# Patient Record
Sex: MALE | Race: WHITE | NOT HISPANIC OR LATINO | Employment: OTHER | URBAN - METROPOLITAN AREA
[De-identification: names, ages, dates, MRNs, and addresses within clinical notes are randomized per-mention and may not be internally consistent; named-entity substitution may affect disease eponyms.]

---

## 2024-06-18 ENCOUNTER — OFFICE VISIT (OUTPATIENT)
Dept: FAMILY MEDICINE CLINIC | Facility: CLINIC | Age: 64
End: 2024-06-18
Payer: MEDICARE

## 2024-06-18 VITALS
DIASTOLIC BLOOD PRESSURE: 82 MMHG | HEIGHT: 69 IN | HEART RATE: 70 BPM | OXYGEN SATURATION: 96 % | WEIGHT: 286.4 LBS | BODY MASS INDEX: 42.42 KG/M2 | SYSTOLIC BLOOD PRESSURE: 135 MMHG

## 2024-06-18 DIAGNOSIS — Z13.220 SCREENING FOR LIPID DISORDERS: ICD-10-CM

## 2024-06-18 DIAGNOSIS — H61.23 IMPACTED CERUMEN OF BOTH EARS: ICD-10-CM

## 2024-06-18 DIAGNOSIS — I10 ESSENTIAL HYPERTENSION: ICD-10-CM

## 2024-06-18 DIAGNOSIS — Z76.89 ENCOUNTER TO ESTABLISH CARE: ICD-10-CM

## 2024-06-18 DIAGNOSIS — N40.1 BENIGN PROSTATIC HYPERPLASIA WITH URINARY OBSTRUCTION: ICD-10-CM

## 2024-06-18 DIAGNOSIS — Z12.5 SCREENING PSA (PROSTATE SPECIFIC ANTIGEN): ICD-10-CM

## 2024-06-18 DIAGNOSIS — R05.8 ALLERGIC COUGH: ICD-10-CM

## 2024-06-18 DIAGNOSIS — Z13.29 SCREENING FOR THYROID DISORDER: ICD-10-CM

## 2024-06-18 DIAGNOSIS — N13.8 BENIGN PROSTATIC HYPERPLASIA WITH URINARY OBSTRUCTION: ICD-10-CM

## 2024-06-18 DIAGNOSIS — F41.9 ANXIETY: ICD-10-CM

## 2024-06-18 DIAGNOSIS — K21.9 GASTROESOPHAGEAL REFLUX DISEASE, UNSPECIFIED WHETHER ESOPHAGITIS PRESENT: ICD-10-CM

## 2024-06-18 DIAGNOSIS — Z23 NEED FOR PNEUMOCOCCAL 20-VALENT CONJUGATE VACCINATION: ICD-10-CM

## 2024-06-18 DIAGNOSIS — G89.29 CHRONIC LOW BACK PAIN, UNSPECIFIED BACK PAIN LATERALITY, UNSPECIFIED WHETHER SCIATICA PRESENT: ICD-10-CM

## 2024-06-18 DIAGNOSIS — Z11.59 NEED FOR HEPATITIS C SCREENING TEST: ICD-10-CM

## 2024-06-18 DIAGNOSIS — Z00.00 WELCOME TO MEDICARE PREVENTIVE VISIT: Primary | ICD-10-CM

## 2024-06-18 DIAGNOSIS — M54.50 CHRONIC LOW BACK PAIN, UNSPECIFIED BACK PAIN LATERALITY, UNSPECIFIED WHETHER SCIATICA PRESENT: ICD-10-CM

## 2024-06-18 DIAGNOSIS — F32.5 MAJOR DEPRESSIVE DISORDER IN FULL REMISSION, UNSPECIFIED WHETHER RECURRENT: ICD-10-CM

## 2024-06-18 DIAGNOSIS — N52.9 ERECTILE DYSFUNCTION, UNSPECIFIED ERECTILE DYSFUNCTION TYPE: ICD-10-CM

## 2024-06-18 PROBLEM — R55 NEAR SYNCOPE: Status: ACTIVE | Noted: 2023-12-14

## 2024-06-18 PROBLEM — F13.20 SEDATIVE DEPENDENCE: Status: ACTIVE | Noted: 2023-12-14

## 2024-06-18 PROBLEM — F33.9 RECURRENT MAJOR DEPRESSION: Status: ACTIVE | Noted: 2024-03-14

## 2024-06-18 PROBLEM — E66.01 MORBID OBESITY: Status: ACTIVE | Noted: 2023-12-14

## 2024-06-18 PROBLEM — R74.8 ELEVATED LIVER ENZYMES: Status: ACTIVE | Noted: 2024-03-21

## 2024-06-18 PROCEDURE — G0402 INITIAL PREVENTIVE EXAM: HCPCS

## 2024-06-18 PROCEDURE — 90677 PCV20 VACCINE IM: CPT

## 2024-06-18 PROCEDURE — 3079F DIAST BP 80-89 MM HG: CPT

## 2024-06-18 PROCEDURE — 69209 REMOVE IMPACTED EAR WAX UNI: CPT

## 2024-06-18 PROCEDURE — 1170F FXNL STATUS ASSESSED: CPT

## 2024-06-18 PROCEDURE — G0009 ADMIN PNEUMOCOCCAL VACCINE: HCPCS

## 2024-06-18 PROCEDURE — 3075F SYST BP GE 130 - 139MM HG: CPT

## 2024-06-18 PROCEDURE — 99214 OFFICE O/P EST MOD 30 MIN: CPT

## 2024-06-18 PROCEDURE — 1125F AMNT PAIN NOTED PAIN PRSNT: CPT

## 2024-06-18 RX ORDER — TELMISARTAN/HYDROCHLOROTHIAZID 40-12.5 MG
1 TABLET ORAL DAILY
COMMUNITY
Start: 2024-04-03

## 2024-06-18 RX ORDER — ALPRAZOLAM 0.5 MG/1
1 TABLET ORAL DAILY
COMMUNITY

## 2024-06-18 RX ORDER — BUPROPION HYDROCHLORIDE 150 MG/1
150 TABLET ORAL DAILY
Qty: 30 TABLET | Refills: 0 | Status: SHIPPED | OUTPATIENT
Start: 2024-06-18

## 2024-06-18 RX ORDER — TAMSULOSIN HYDROCHLORIDE 0.4 MG/1
1 CAPSULE ORAL DAILY
COMMUNITY
End: 2024-06-18 | Stop reason: SDUPTHER

## 2024-06-18 RX ORDER — TAMSULOSIN HYDROCHLORIDE 0.4 MG/1
1 CAPSULE ORAL DAILY
Qty: 30 CAPSULE | Refills: 2 | Status: SHIPPED | OUTPATIENT
Start: 2024-06-18

## 2024-06-18 RX ORDER — FEXOFENADINE HCL 180 MG/1
180 TABLET ORAL DAILY
Qty: 90 TABLET | Refills: 1 | Status: SHIPPED | OUTPATIENT
Start: 2024-06-18

## 2024-06-18 RX ORDER — BUPROPION HYDROCHLORIDE 300 MG/1
1 TABLET ORAL DAILY
COMMUNITY
Start: 2024-04-03 | End: 2024-06-18 | Stop reason: SDUPTHER

## 2024-06-18 RX ORDER — TADALAFIL 5 MG/1
1 TABLET ORAL DAILY
COMMUNITY

## 2024-06-18 RX ORDER — TRAMADOL HYDROCHLORIDE 50 MG/1
50 TABLET ORAL EVERY 8 HOURS PRN
COMMUNITY

## 2024-06-18 RX ORDER — PANTOPRAZOLE SODIUM 40 MG/1
1 TABLET, DELAYED RELEASE ORAL DAILY
COMMUNITY
Start: 2024-04-03

## 2024-06-18 RX ORDER — ATENOLOL 25 MG/1
1 TABLET ORAL DAILY
COMMUNITY
Start: 2024-04-03

## 2024-06-18 NOTE — PROGRESS NOTES
The ABCs of the Annual Wellness Visit  Welcome to Medicare Visit    Subjective     Dennis Finnegan is a 64 y.o. male who presents for a  Welcome to Medicare Visit.    The following portions of the patient's history were reviewed and   updated as appropriate: He  has no past medical history on file.  He does not have any pertinent problems on file.  He  has no past surgical history on file.  His family history is not on file.  He  has no history on file for tobacco use, alcohol use, and drug use.  Current Outpatient Medications   Medication Sig Dispense Refill   • ALPRAZolam (XANAX) 0.5 MG tablet Take 1 tablet by mouth Daily.     • atenolol (TENORMIN) 25 MG tablet Take 1 tablet by mouth Daily.     • buPROPion XL (WELLBUTRIN XL) 150 MG 24 hr tablet Take 1 tablet by mouth Daily. 30 tablet 0   • Micardis HCT 40-12.5 MG per tablet Take 1 tablet by mouth Daily.     • pantoprazole (PROTONIX) 40 MG EC tablet Take 1 tablet by mouth Daily.     • tadalafil (CIALIS) 5 MG tablet Take 1 tablet by mouth Daily.     • tamsulosin (FLOMAX) 0.4 MG capsule 24 hr capsule Take 1 capsule by mouth Daily. 30 capsule 2   • traMADol (ULTRAM) 50 MG tablet Take 1 tablet by mouth Every 8 (Eight) Hours As Needed for Moderate Pain.     • fexofenadine (Allegra Allergy) 180 MG tablet Take 1 tablet by mouth Daily. 90 tablet 1     No current facility-administered medications for this visit.     Current Outpatient Medications on File Prior to Visit   Medication Sig   • ALPRAZolam (XANAX) 0.5 MG tablet Take 1 tablet by mouth Daily.   • atenolol (TENORMIN) 25 MG tablet Take 1 tablet by mouth Daily.   • Micardis HCT 40-12.5 MG per tablet Take 1 tablet by mouth Daily.   • pantoprazole (PROTONIX) 40 MG EC tablet Take 1 tablet by mouth Daily.   • tadalafil (CIALIS) 5 MG tablet Take 1 tablet by mouth Daily.   • traMADol (ULTRAM) 50 MG tablet Take 1 tablet by mouth Every 8 (Eight) Hours As Needed for Moderate Pain.   • [DISCONTINUED] buPROPion XL (WELLBUTRIN XL) 300  MG 24 hr tablet Take 1 tablet by mouth Daily.   • [DISCONTINUED] tamsulosin (FLOMAX) 0.4 MG capsule 24 hr capsule Take 1 capsule by mouth Daily.     No current facility-administered medications on file prior to visit.     He has No Known Allergies..     Compared to one year ago, the patient feels his physical   health is the same.    Compared to one year ago, the patient feels his mental   health is the same.    Recent Hospitalizations:  He was not admitted to the hospital during the last year.       Current Medical Providers:  Patient Care Team:  Lia Gillis APRN as PCP - General (Nurse Practitioner)    Outpatient Medications Prior to Visit   Medication Sig Dispense Refill   • ALPRAZolam (XANAX) 0.5 MG tablet Take 1 tablet by mouth Daily.     • atenolol (TENORMIN) 25 MG tablet Take 1 tablet by mouth Daily.     • Micardis HCT 40-12.5 MG per tablet Take 1 tablet by mouth Daily.     • pantoprazole (PROTONIX) 40 MG EC tablet Take 1 tablet by mouth Daily.     • tadalafil (CIALIS) 5 MG tablet Take 1 tablet by mouth Daily.     • traMADol (ULTRAM) 50 MG tablet Take 1 tablet by mouth Every 8 (Eight) Hours As Needed for Moderate Pain.     • buPROPion XL (WELLBUTRIN XL) 300 MG 24 hr tablet Take 1 tablet by mouth Daily.     • tamsulosin (FLOMAX) 0.4 MG capsule 24 hr capsule Take 1 capsule by mouth Daily.       No facility-administered medications prior to visit.       Opioid medication/s are on active medication list.  and I have evaluated his active treatment plan and pain score trends (see table).  Vitals:    06/18/24 0811   PainSc:   5   PainLoc: Back     I have reviewed the chart for potential of high risk medication and harmful drug interactions in the elderly.          Aspirin is not on active medication list.  Aspirin use is not indicated based on review of current medical condition/s. Risk of harm outweighs potential benefits.  .    Patient Active Problem List   Diagnosis   • Anxiety   • Benign prostatic  "hyperplasia with urinary obstruction   • Elevated liver enzymes   • Essential hypertension   • Gastroesophageal reflux disease   • Morbid obesity   • Near syncope   • Recurrent major depression   • Sedative dependence   • ED (erectile dysfunction)     Advance Care Planning   Advance Care Planning     Advance Directive is not on file.  ACP discussion was held with the patient during this visit. Patient does not have an advance directive, information provided.       Objective   Vitals:    06/18/24 0811   BP: 135/82   BP Location: Left arm   Patient Position: Sitting   Cuff Size: Large Adult   Pulse: 70   SpO2: 96%   Weight: 130 kg (286 lb 6.4 oz)   Height: 175.3 cm (69\")   PainSc:   5   PainLoc: Back     Estimated body mass index is 42.29 kg/m² as calculated from the following:    Height as of this encounter: 175.3 cm (69\").    Weight as of this encounter: 130 kg (286 lb 6.4 oz).    Class 3 Severe Obesity (BMI >=40). Obesity-related health conditions include the following: hypertension. Obesity is newly identified. BMI is is above average; BMI management plan is completed. We discussed portion control and increasing exercise.      Does the patient have evidence of cognitive impairment?   No         Ear Cerumen Removal    Date/Time: 6/18/2024 8:52 AM    Performed by: Lia Gillis APRN  Authorized by: Lia Gillis APRN    Anesthesia:  Local Anesthetic: none  Location details: left ear and right ear  Patient tolerance: patient tolerated the procedure well with no immediate complications  Procedure type: irrigation   Sedation:  Patient sedated: no             HEALTH RISK ASSESSMENT    Smoking Status:  Social History     Tobacco Use   Smoking Status Not on file   Smokeless Tobacco Not on file     Alcohol Consumption:  Social History     Substance and Sexual Activity   Alcohol Use None       Fall Risk Screen:    STEADI Fall Risk Assessment was completed, and patient is at LOW risk for falls.Assessment completed " on:2024    Depression Screen:       2024     8:15 AM   PHQ-2/PHQ-9 Depression Screening   Little Interest or Pleasure in Doing Things 0-->not at all   Feeling Down, Depressed or Hopeless 0-->not at all   PHQ-9: Brief Depression Severity Measure Score 0       Health Habits and Functional and Cognitive Screenin/18/2024     8:00 AM   Functional & Cognitive Status   Do you have difficulty preparing food and eating? No   Do you have difficulty bathing yourself, getting dressed or grooming yourself? No   Do you have difficulty using the toilet? No   Do you have difficulty moving around from place to place? No   Do you have trouble with steps or getting out of a bed or a chair? No   Current Diet Frequent Junk Food   Dental Exam Not up to date   Eye Exam Not up to date   Exercise (times per week) 4 times per week   Current Exercises Include Yard Work;Other   Do you need help using the phone?  No   Are you deaf or do you have serious difficulty hearing?  No   Do you need help to go to places out of walking distance? No   Do you need help shopping? No   Do you need help preparing meals?  No   Do you need help with housework?  No   Do you need help with laundry? No   Do you need help taking your medications? No   Do you need help managing money? No   Do you ever drive or ride in a car without wearing a seat belt? No   Have you felt unusual stress, anger or loneliness in the last month? No   Who do you live with? Alone   If you need help, do you have trouble finding someone available to you? No   Have you been bothered in the last four weeks by sexual problems? No   Do you have difficulty concentrating, remembering or making decisions? No       Visual Acuity:    No results found.    Age-appropriate Screening Schedule:  Refer to the list below for future screening recommendations based on patient's age, sex and/or medical conditions. Orders for these recommended tests are listed in the plan section. The  patient has been provided with a written plan.    Health Maintenance   Topic Date Due   • HEPATITIS C SCREENING  Never done   • ZOSTER VACCINE (1 of 2) 06/18/2024 (Originally 1/16/2009)   • COVID-19 Vaccine (3 - 2023-24 season) 09/07/2024 (Originally 9/1/2023)   • RSV Vaccine - Adults (1 - 1-dose 60+ series) 06/18/2025 (Originally 1/16/2019)   • TDAP/TD VACCINES (1 - Tdap) 06/18/2025 (Originally 1/16/1979)   • INFLUENZA VACCINE  08/01/2024   • ANNUAL WELLNESS VISIT  06/18/2025   • BMI FOLLOWUP  06/18/2025   • COLORECTAL CANCER SCREENING  01/02/2026   • Pneumococcal Vaccine 65+  Completed        CMS Preventative Services Quick Reference  Risk Factors Identified During Encounter    None Identified  Chronic Pain: Pain Management Referral Ordered  The above risks/problems have been discussed with the patient.  Pertinent information has been shared with the patient in the After Visit Summary.    Follow Up:   Initial Medicare Visit in one year    An After Visit Summary and PPPS were made available to the patient.      Additional E&M Note during same encounter follows:  Patient has multiple medical problems which are significant and separately identifiable that require additional work above and beyond the Medicare Wellness Visit.      Chief Complaint  Welcome To Medicare and Establish Care    Subjective        MILDRED Collins Kain is also being seen today to establish care with new PCP. He recently moved to [place] from [place]. He needs chronic condition management of htn, gerd, anxiety, depression, chronic low back pain, BPH and ED.    HTN-is currently on atenolol and Micardis.  Patient's blood pressure is well-controlled in office today at 135/82.  Denies any current chest pain, headache, shortness of breath, syncope.    GERD-patient is currently taking Protonix daily.  Patient reports this controls his reflux well.  Patient had Cologuard done last year that was negative.    Anxiety-patient was previously on Xanax.  Patient reports he  still has some things at home and has not to take any in the past 3 months since retiring and moving to [place].  Goal is to get him off of this.    Depression-patient currently on Wellbutrin 300 mg.  Patient like to wean down off of this.  Patient reports that retiring his had less depression.  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    BPH and erectile dysfunction-patient is currently on Cialis and Flomax.  Patient reports that controls his symptoms well.  Patient is due PSA screening.    Chronic low back pain-patient is currently taking tramadol one half a day.  Patient reports he has been able to wean himself down from 3 x 1 times a day.  He is open to referral to pain management at this time.    Patient is due for pneumonia vaccine is agreeable to have done in office today.  Patient is also due Tdap and shingles that she can get through his pharmacy.    Patient also reports that he has had some mild chest congestion and a cough since moving to [place].  Patient reports that usually worsens her condition.  Patient Has Not on Any Type of Allergy Medication.  Patient Denies Any History of Asthma.    Patient is due labs. Orders placed at today's visit. Discussed with patient that these are fasting labs.     No other concerns or complaints at this time.  Patient denies any diarrhea, constipation, blood in stool, urinary urgency, frequency, or dysuria, chest pain, shortness of breath or syncope.       Review of Systems   Constitutional: Negative.    HENT:  Positive for hearing loss.    Eyes: Negative.    Respiratory:  Positive for cough.    Cardiovascular: Negative.    Gastrointestinal: Negative.    Endocrine: Negative.    Genitourinary: Negative.    Skin: Negative.    Neurological: Negative.    Hematological: Negative.    Psychiatric/Behavioral: Negative.         Objective   Vital Signs:  /82 (BP Location: Left arm, Patient Position: Sitting, Cuff  "Size: Large Adult)   Pulse 70   Ht 175.3 cm (69\")   Wt 130 kg (286 lb 6.4 oz)   SpO2 96%   BMI 42.29 kg/m²     Physical Exam  Vitals reviewed.   Constitutional:       General: He is awake. He is not in acute distress.     Appearance: He is well-developed and well-groomed. He is not ill-appearing.   HENT:      Head: Normocephalic and atraumatic.      Right Ear: Tympanic membrane, ear canal and external ear normal. There is impacted cerumen.      Left Ear: Tympanic membrane, ear canal and external ear normal. There is impacted cerumen.      Ears:      Comments: Normal exam post irrigation     Nose: Nose normal.      Mouth/Throat:      Mouth: Mucous membranes are moist.      Pharynx: Oropharynx is clear. No oropharyngeal exudate or posterior oropharyngeal erythema.   Eyes:      Extraocular Movements: Extraocular movements intact.      Conjunctiva/sclera: Conjunctivae normal.      Pupils: Pupils are equal, round, and reactive to light.   Neck:      Thyroid: No thyroid mass, thyromegaly or thyroid tenderness.   Cardiovascular:      Rate and Rhythm: Normal rate and regular rhythm.      Heart sounds: Normal heart sounds.   Pulmonary:      Effort: Pulmonary effort is normal.      Breath sounds: Normal breath sounds.   Abdominal:      General: Abdomen is flat. Bowel sounds are normal. There is no distension.      Palpations: Abdomen is soft.      Tenderness: There is no abdominal tenderness.   Musculoskeletal:         General: Normal range of motion.      Cervical back: Normal range of motion and neck supple. No rigidity or tenderness.   Lymphadenopathy:      Cervical: No cervical adenopathy.   Skin:     General: Skin is warm and dry.   Neurological:      Mental Status: He is alert and oriented to person, place, and time.   Psychiatric:         Mood and Affect: Mood normal.         Behavior: Behavior normal. Behavior is cooperative.         Thought Content: Thought content normal.         Judgment: Judgment normal.    "     The following data was reviewed by: FROYLAN Thomas on 06/18/2024:            Assessment and Plan   Diagnoses and all orders for this visit:    1. Welcome to Medicare preventive visit (Primary)  Comments:  care gaps addressed, given number to local dentist and optometrist  Orders:  -     Comprehensive Metabolic Panel; Future  -     CBC & Differential; Future  -     Lipid Panel; Future  -     TSH+Free T4; Future  -     Hepatitis C antibody; Future  -     Pneumococcal Conjugate Vaccine 20-Valent (PCV20)  -     PSA SCREENING; Future    2. Encounter to establish care  Comments:  medical hx and medications reviewed with patient    3. Screening for lipid disorders  -     Lipid Panel; Future    4. Screening for thyroid disorder  -     TSH+Free T4; Future    5. Need for hepatitis C screening test  -     Hepatitis C antibody; Future    6. Essential hypertension  Comments:  stable on atenolol and micardis, continue    7. Gastroesophageal reflux disease, unspecified whether esophagitis present  Comments:  stable on protonix, continue    8. Benign prostatic hyperplasia with urinary obstruction  Comments:  stable on flomax, continue  Orders:  -     tamsulosin (FLOMAX) 0.4 MG capsule 24 hr capsule; Take 1 capsule by mouth Daily.  Dispense: 30 capsule; Refill: 2    9. Anxiety  Comments:  continue to wean off Xanax    10. Need for pneumococcal 20-valent conjugate vaccination  Comments:  given in office today  Orders:  -     Pneumococcal Conjugate Vaccine 20-Valent (PCV20)    11. Major depressive disorder in full remission, unspecified whether recurrent  Comments:  wean down wellbutrin to 150 mg  Orders:  -     buPROPion XL (WELLBUTRIN XL) 150 MG 24 hr tablet; Take 1 tablet by mouth Daily.  Dispense: 30 tablet; Refill: 0    12. Allergic cough  Comments:  start allegra  Orders:  -     fexofenadine (Allegra Allergy) 180 MG tablet; Take 1 tablet by mouth Daily.  Dispense: 90 tablet; Refill: 1    13. Screening PSA (prostate  specific antigen)  -     PSA SCREENING; Future    14. Impacted cerumen of both ears  Comments:  cleared with irrigation  Orders:  -     Ear Cerumen Removal    15. Chronic low back pain, unspecified back pain laterality, unspecified whether sciatica present  Comments:  ref to pain management for tramadol management  Orders:  -     Ambulatory Referral to Pain Management    16. Erectile dysfunction, unspecified erectile dysfunction type  Comments:  stable on cialis, continue             Follow Up   No follow-ups on file.  Patient was given instructions and counseling regarding his condition or for health maintenance advice. Please see specific information pulled into the AVS if appropriate.

## 2024-06-18 NOTE — PROGRESS NOTES
Chief Complaint  No chief complaint on file.    SOPHIE Finnegan presents to [facility]    History of Present Illness  Patient is a 64-year-old male who presents today to establish care.  Previous PCP was---.  He is due annual physical exam, to be done in office today.  Needs chronic condition management of HTN,BPH, anxiety,       HTN-     BPH-     Anxiety-         Colon cancer screening-     TDAP-     Zoster-     Pneumonia-    RSV-    Patient is due labs. Orders placed at today's visit. Discussed with patient that these are fasting labs.     No other concerns or complaints at this time.  Patient denies any diarrhea, constipation, blood in stool, urinary urgency, frequency, or dysuria, chest pain, shortness of breath or syncope.           No past medical history on file.   No family history on file.   No past surgical history on file.   No current outpatient medications on file.    OBJECTIVE  Vital Signs:   There were no vitals taken for this visit.   There is no height or weight on file to calculate BMI.     Wt Readings from Last 3 Encounters:   No data found for Wt     BP Readings from Last 3 Encounters:   No data found for BP       Physical Exam     Result Review    {[facility] Labs (Optional):54480}    No Images in the past 120 days found..      The above data has been reviewed by FROYLAN Thomas 06/18/2024 07:03 EDT.          No care team member to display    BMI cannot be calculated due to outdated height or weight values.  Please input a current height/weight in Vitals and re-renter BMIFOLLOWUP in Note to pull in correct documentation based on BMI range.       ASSESSMENT & PLAN    Diagnoses and all orders for this visit:    1. Annual physical exam (Primary)    2. Encounter to establish care    3. Screening for lipid disorders    4. Screening for thyroid disorder    5. Need for hepatitis C screening test    6. Essential hypertension    7. Gastroesophageal  reflux disease, unspecified whether esophagitis present    8. Benign prostatic hyperplasia with urinary obstruction    9. Anxiety         Tobacco Use: Not on file       Follow Up     No follow-ups on file.      Patient was given instructions and counseling regarding his condition or for health maintenance advice. Please see specific information pulled into the AVS if appropriate.   I have reviewed information obtained and documented by others and I have confirmed the accuracy of this documented note.    FROYLAN Thomas

## 2024-06-18 NOTE — LETTER
[facility]  Vaccine Consent Form    Patient Name:  Dennis Finnegan  Patient :  1960        Screening Checklist  The following questions should be completed prior to vaccination. If you answer “yes” to any question, it does not necessarily mean you should not be vaccinated. It just means we may need to clarify or ask more questions. If a question is unclear, please ask your healthcare provider to explain it.    Yes No   Any fever or moderate to severe illness today (mild illness and/or antibiotic treatment are not contraindications)?     Do you have a history of a serious reaction to any previous vaccinations, such as anaphylaxis, encephalopathy within 7 days, Guillain-Richland syndrome within 6 weeks, seizure?     Have you received any live vaccine(s) (e.g MMR, KERWIN) or any other vaccines in the last month (to ensure duplicate doses aren't given)?     Do you have an anaphylactic allergy to latex (DTaP, DTaP-IPV, Hep A, Hep B, MenB, RV, Td, Tdap), baker’s yeast (Hep B, HPV), polysorbates (RSV, nirsevimab, PCV 20, Rotavirrus, Tdap, Shingrix), or gelatin (KERWIN, MMR)?     Do you have an anaphylactic allergy to neomycin (Rabies, KERWIN, MMR, IPV, Hep A), polymyxin B (IPV), or streptomycin (IPV)?      Any cancer, leukemia, AIDS, or other immune system disorder? (KERWIN, MMR, RV)     Do you have a parent, brother, or sister with an immune system problem (if immune competence of vaccine recipient clinically verified, can proceed)? (MMR, KERWIN)     Any recent steroid treatments for >2 weeks, chemotherapy, or radiation treatment? (KERWIN, MMR)     Have you received antibody-containing blood transfusions or IVIG in the past 11 months (recommended interval is dependent on product)? (MMR, KERWIN)     Have you taken antiviral drugs (acyclovir, famciclovir, valacyclovir for KERWIN) in the last 24 or 48 hours, respectively?      Are you pregnant or planning to become pregnant within 1 month? (KERWIN, MMR, HPV, IPV, MenB, Abrexvy; For Hep B- refer to  "Engerix-B; For RSV - Abrysvo is indicated for 32-36 weeks of pregnancy from September to January)     For infants, have you ever been told your child has had intussusception or a medical emergency involving obstruction of the intestine (Rotavirus)? If not for an infant, can skip this question.         *Ordering Physicians/APC should be consulted if \"yes\" is checked by the patient or guardian above.  I have received, read, and understand the Vaccine Information Statement (VIS) for each vaccine ordered.  I have considered my or my child's health status as well as the health status of my close contacts.  I have taken the opportunity to discuss my vaccine questions with my or my child's health care provider.   I have requested that the ordered vaccine(s) be given to me or my child.  I understand the benefits and risks of the vaccines.  I understand that I should remain in the clinic for 15 minutes after receiving the vaccine(s).  _________________________________________________________  Signature of Patient or Parent/Legal Guardian ____________________  Date     "

## 2024-06-20 ENCOUNTER — LAB (OUTPATIENT)
Dept: LAB | Facility: HOSPITAL | Age: 64
End: 2024-06-20
Payer: MEDICARE

## 2024-06-20 DIAGNOSIS — Z00.00 WELCOME TO MEDICARE PREVENTIVE VISIT: ICD-10-CM

## 2024-06-20 DIAGNOSIS — Z12.5 SCREENING PSA (PROSTATE SPECIFIC ANTIGEN): ICD-10-CM

## 2024-06-20 DIAGNOSIS — Z13.29 SCREENING FOR THYROID DISORDER: ICD-10-CM

## 2024-06-20 DIAGNOSIS — Z13.220 SCREENING FOR LIPID DISORDERS: ICD-10-CM

## 2024-06-20 DIAGNOSIS — Z11.59 NEED FOR HEPATITIS C SCREENING TEST: ICD-10-CM

## 2024-06-20 LAB
ALBUMIN SERPL-MCNC: 3.9 G/DL (ref 3.5–5.2)
ALBUMIN/GLOB SERPL: 1.4 G/DL
ALP SERPL-CCNC: 59 U/L (ref 39–117)
ALT SERPL W P-5'-P-CCNC: 48 U/L (ref 1–41)
ANION GAP SERPL CALCULATED.3IONS-SCNC: 12.8 MMOL/L (ref 5–15)
AST SERPL-CCNC: 41 U/L (ref 1–40)
BASOPHILS # BLD AUTO: 0.07 10*3/MM3 (ref 0–0.2)
BASOPHILS NFR BLD AUTO: 0.8 % (ref 0–1.5)
BILIRUB SERPL-MCNC: 0.6 MG/DL (ref 0–1.2)
BUN SERPL-MCNC: 8 MG/DL (ref 8–23)
BUN/CREAT SERPL: 8.6 (ref 7–25)
CALCIUM SPEC-SCNC: 9.3 MG/DL (ref 8.6–10.5)
CHLORIDE SERPL-SCNC: 103 MMOL/L (ref 98–107)
CHOLEST SERPL-MCNC: 162 MG/DL (ref 0–200)
CO2 SERPL-SCNC: 25.2 MMOL/L (ref 22–29)
CREAT SERPL-MCNC: 0.93 MG/DL (ref 0.76–1.27)
DEPRECATED RDW RBC AUTO: 45.6 FL (ref 37–54)
EGFRCR SERPLBLD CKD-EPI 2021: 91.1 ML/MIN/1.73
EOSINOPHIL # BLD AUTO: 0.49 10*3/MM3 (ref 0–0.4)
EOSINOPHIL NFR BLD AUTO: 5.5 % (ref 0.3–6.2)
ERYTHROCYTE [DISTWIDTH] IN BLOOD BY AUTOMATED COUNT: 13 % (ref 12.3–15.4)
GLOBULIN UR ELPH-MCNC: 2.7 GM/DL
GLUCOSE SERPL-MCNC: 104 MG/DL (ref 65–99)
HCT VFR BLD AUTO: 48.2 % (ref 37.5–51)
HCV AB SER QL: NORMAL
HDLC SERPL-MCNC: 41 MG/DL (ref 40–60)
HGB BLD-MCNC: 16.4 G/DL (ref 13–17.7)
IMM GRANULOCYTES # BLD AUTO: 0.02 10*3/MM3 (ref 0–0.05)
IMM GRANULOCYTES NFR BLD AUTO: 0.2 % (ref 0–0.5)
LDLC SERPL CALC-MCNC: 98 MG/DL (ref 0–100)
LDLC/HDLC SERPL: 2.33 {RATIO}
LYMPHOCYTES # BLD AUTO: 3.32 10*3/MM3 (ref 0.7–3.1)
LYMPHOCYTES NFR BLD AUTO: 37.3 % (ref 19.6–45.3)
MCH RBC QN AUTO: 32.1 PG (ref 26.6–33)
MCHC RBC AUTO-ENTMCNC: 34 G/DL (ref 31.5–35.7)
MCV RBC AUTO: 94.3 FL (ref 79–97)
MONOCYTES # BLD AUTO: 0.79 10*3/MM3 (ref 0.1–0.9)
MONOCYTES NFR BLD AUTO: 8.9 % (ref 5–12)
NEUTROPHILS NFR BLD AUTO: 4.22 10*3/MM3 (ref 1.7–7)
NEUTROPHILS NFR BLD AUTO: 47.3 % (ref 42.7–76)
NRBC BLD AUTO-RTO: 0 /100 WBC (ref 0–0.2)
PLATELET # BLD AUTO: 314 10*3/MM3 (ref 140–450)
PMV BLD AUTO: 10.5 FL (ref 6–12)
POTASSIUM SERPL-SCNC: 4.1 MMOL/L (ref 3.5–5.2)
PROT SERPL-MCNC: 6.6 G/DL (ref 6–8.5)
PSA SERPL-MCNC: 0.79 NG/ML (ref 0–4)
RBC # BLD AUTO: 5.11 10*6/MM3 (ref 4.14–5.8)
SODIUM SERPL-SCNC: 141 MMOL/L (ref 136–145)
T4 FREE SERPL-MCNC: 1.01 NG/DL (ref 0.92–1.68)
TRIGL SERPL-MCNC: 127 MG/DL (ref 0–150)
TSH SERPL DL<=0.05 MIU/L-ACNC: 1.84 UIU/ML (ref 0.27–4.2)
VLDLC SERPL-MCNC: 23 MG/DL (ref 5–40)
WBC NRBC COR # BLD AUTO: 8.91 10*3/MM3 (ref 3.4–10.8)

## 2024-06-20 PROCEDURE — 80061 LIPID PANEL: CPT

## 2024-06-20 PROCEDURE — 84439 ASSAY OF FREE THYROXINE: CPT

## 2024-06-20 PROCEDURE — 86803 HEPATITIS C AB TEST: CPT

## 2024-06-20 PROCEDURE — G0103 PSA SCREENING: HCPCS

## 2024-06-20 PROCEDURE — 85025 COMPLETE CBC W/AUTO DIFF WBC: CPT

## 2024-06-20 PROCEDURE — 36415 COLL VENOUS BLD VENIPUNCTURE: CPT

## 2024-06-20 PROCEDURE — 80053 COMPREHEN METABOLIC PANEL: CPT

## 2024-06-20 PROCEDURE — 84443 ASSAY THYROID STIM HORMONE: CPT

## 2024-06-21 DIAGNOSIS — R79.89 ELEVATED LFTS: Primary | ICD-10-CM

## 2024-07-16 ENCOUNTER — LAB (OUTPATIENT)
Dept: LAB | Facility: HOSPITAL | Age: 64
End: 2024-07-16
Payer: MEDICARE

## 2024-07-16 DIAGNOSIS — R79.89 ELEVATED LFTS: ICD-10-CM

## 2024-07-16 LAB
ALBUMIN SERPL-MCNC: 4.2 G/DL (ref 3.5–5.2)
ALP SERPL-CCNC: 59 U/L (ref 39–117)
ALT SERPL W P-5'-P-CCNC: 68 U/L (ref 1–41)
AST SERPL-CCNC: 45 U/L (ref 1–40)
BILIRUB CONJ SERPL-MCNC: <0.2 MG/DL (ref 0–0.3)
BILIRUB INDIRECT SERPL-MCNC: ABNORMAL MG/DL
BILIRUB SERPL-MCNC: 0.6 MG/DL (ref 0–1.2)
PROT SERPL-MCNC: 6.9 G/DL (ref 6–8.5)

## 2024-07-16 PROCEDURE — 80076 HEPATIC FUNCTION PANEL: CPT

## 2024-07-16 PROCEDURE — 36415 COLL VENOUS BLD VENIPUNCTURE: CPT

## 2024-07-17 ENCOUNTER — HOSPITAL ENCOUNTER (OUTPATIENT)
Dept: GENERAL RADIOLOGY | Facility: HOSPITAL | Age: 64
Discharge: HOME | End: 2024-07-17
Payer: MEDICARE

## 2024-07-17 ENCOUNTER — OFFICE VISIT (OUTPATIENT)
Dept: FAMILY MEDICINE CLINIC | Facility: CLINIC | Age: 64
End: 2024-07-17
Payer: MEDICARE

## 2024-07-17 VITALS
WEIGHT: 285 LBS | BODY MASS INDEX: 42.21 KG/M2 | SYSTOLIC BLOOD PRESSURE: 129 MMHG | OXYGEN SATURATION: 95 % | HEIGHT: 69 IN | DIASTOLIC BLOOD PRESSURE: 72 MMHG | HEART RATE: 86 BPM

## 2024-07-17 DIAGNOSIS — F41.9 ANXIETY: Primary | ICD-10-CM

## 2024-07-17 DIAGNOSIS — R05.8 ALLERGIC COUGH: ICD-10-CM

## 2024-07-17 DIAGNOSIS — I10 ESSENTIAL HYPERTENSION: ICD-10-CM

## 2024-07-17 DIAGNOSIS — E66.01 MORBID (SEVERE) OBESITY DUE TO EXCESS CALORIES: ICD-10-CM

## 2024-07-17 DIAGNOSIS — F32.5 MAJOR DEPRESSIVE DISORDER IN FULL REMISSION, UNSPECIFIED WHETHER RECURRENT: ICD-10-CM

## 2024-07-17 DIAGNOSIS — Z91.89 AT RISK FOR CARDIOVASCULAR EVENT: ICD-10-CM

## 2024-07-17 PROCEDURE — 1160F RVW MEDS BY RX/DR IN RCRD: CPT

## 2024-07-17 PROCEDURE — 3074F SYST BP LT 130 MM HG: CPT

## 2024-07-17 PROCEDURE — 3078F DIAST BP <80 MM HG: CPT

## 2024-07-17 PROCEDURE — 71046 X-RAY EXAM CHEST 2 VIEWS: CPT

## 2024-07-17 PROCEDURE — 1125F AMNT PAIN NOTED PAIN PRSNT: CPT

## 2024-07-17 PROCEDURE — 99214 OFFICE O/P EST MOD 30 MIN: CPT

## 2024-07-17 PROCEDURE — 1159F MED LIST DOCD IN RCRD: CPT

## 2024-07-17 RX ORDER — MONTELUKAST SODIUM 10 MG/1
10 TABLET ORAL NIGHTLY
Qty: 90 TABLET | Refills: 0 | Status: SHIPPED | OUTPATIENT
Start: 2024-07-17

## 2024-07-17 RX ORDER — SEMAGLUTIDE 0.25 MG/.5ML
0.25 INJECTION, SOLUTION SUBCUTANEOUS WEEKLY
Qty: 2 ML | Refills: 0 | Status: SHIPPED | OUTPATIENT
Start: 2024-07-17

## 2024-07-17 NOTE — PROGRESS NOTES
Chief Complaint  Depression, Anxiety, and Allergies    SOPHIE Finnegan presents to [facility]    History of Present Illness  Patient is a 64-year-old male presents today for 1 month follow-up on depression, anxiety and allergies.    Depression/anxiety-patient was decreased to 150 mg of Wellbutrin at last appointment. He is doing well and feels he does not need this anymore and wishes to stop taking. He has also not had to use any of his Xanax.     Cough- he is still having a cough. States it is worse when his fan is blowing, it is cold, or when he eats or drinks. He is on protonix 40 for reflux. He was started on Allegra at last appt but has not noticed much relief. He does have a smoking hx but quit 21 years ago.     He is also inquiring about wegovy for weight loss. He is morbidly obese. He has been trying diet and exercise for the past 6 months with no success. He has HTN, low back pain and GERD that would be greatly reduced with weight loss.     Past Medical History:   Diagnosis Date    Anxiety     BPH (benign prostatic hyperplasia)     Depression     Erectile dysfunction     GERD (gastroesophageal reflux disease)     Hypertension     Low back pain       History reviewed. No pertinent family history.   History reviewed. No pertinent surgical history.     Current Outpatient Medications:     ALPRAZolam (XANAX) 0.5 MG tablet, Take 1 tablet by mouth Daily., Disp: , Rfl:     atenolol (TENORMIN) 25 MG tablet, Take 1 tablet by mouth Daily., Disp: , Rfl:     fexofenadine (Allegra Allergy) 180 MG tablet, Take 1 tablet by mouth Daily., Disp: 90 tablet, Rfl: 1    Micardis HCT 40-12.5 MG per tablet, Take 1 tablet by mouth Daily., Disp: , Rfl:     pantoprazole (PROTONIX) 40 MG EC tablet, Take 1 tablet by mouth Daily., Disp: , Rfl:     tadalafil (CIALIS) 5 MG tablet, Take 1 tablet by mouth Daily., Disp: , Rfl:     tamsulosin (FLOMAX) 0.4 MG capsule 24 hr capsule, Take 1 capsule by mouth  "Daily., Disp: 30 capsule, Rfl: 2    traMADol (ULTRAM) 50 MG tablet, Take 1 tablet by mouth Every 8 (Eight) Hours As Needed for Moderate Pain., Disp: , Rfl:     montelukast (Singulair) 10 MG tablet, Take 1 tablet by mouth Every Night., Disp: 90 tablet, Rfl: 0    Semaglutide-Weight Management (Wegovy) 0.25 MG/0.5ML solution auto-injector, Inject 0.5 mL under the skin into the appropriate area as directed 1 (One) Time Per Week., Disp: 2 mL, Rfl: 0    OBJECTIVE  Vital Signs:   /72 (BP Location: Left arm, Patient Position: Sitting, Cuff Size: Large Adult)   Pulse 86   Ht 175.3 cm (69\")   Wt 129 kg (285 lb)   SpO2 95%   BMI 42.09 kg/m²    Estimated body mass index is 42.09 kg/m² as calculated from the following:    Height as of this encounter: 175.3 cm (69\").    Weight as of this encounter: 129 kg (285 lb).     Wt Readings from Last 3 Encounters:   07/17/24 129 kg (285 lb)   06/18/24 130 kg (286 lb 6.4 oz)     BP Readings from Last 3 Encounters:   07/17/24 129/72   06/18/24 135/82       Physical Exam  Vitals reviewed.   Constitutional:       General: He is not in acute distress.     Appearance: He is morbidly obese.   HENT:      Head: Normocephalic and atraumatic.   Eyes:      Conjunctiva/sclera: Conjunctivae normal.   Cardiovascular:      Rate and Rhythm: Normal rate and regular rhythm.      Heart sounds: Normal heart sounds.   Pulmonary:      Effort: Pulmonary effort is normal. No respiratory distress.      Breath sounds: Normal breath sounds. No wheezing or rhonchi.   Skin:     General: Skin is warm and dry.   Neurological:      Mental Status: He is alert and oriented to person, place, and time.   Psychiatric:         Mood and Affect: Mood normal.         Behavior: Behavior normal.         Thought Content: Thought content normal.         Judgment: Judgment normal.          Result Review    Common labs          6/20/2024    06:37 7/16/2024    06:43   Common Labs   Glucose 104     BUN 8     Creatinine 0.93   "   Sodium 141     Potassium 4.1     Chloride 103     Calcium 9.3     Albumin 3.9  4.2    Total Bilirubin 0.6  0.6    Alkaline Phosphatase 59  59    AST (SGOT) 41  45    ALT (SGPT) 48  68    WBC 8.91     Hemoglobin 16.4     Hematocrit 48.2     Platelets 314     Total Cholesterol 162     Triglycerides 127     HDL Cholesterol 41     LDL Cholesterol  98     PSA 0.787         No Images in the past 120 days found..      The above data has been reviewed by FROYLAN Thomas 07/17/2024 07:36 EDT.          Patient Care Team:  Lia Gillis APRN as PCP - General (Nurse Practitioner)            ASSESSMENT & PLAN    Diagnoses and all orders for this visit:    1. Anxiety (Primary)  Comments:  stable,not having to take xanax    2. Major depressive disorder in full remission, unspecified whether recurrent  Comments:  stop wellbutrin    3. Allergic cough  Comments:  start singulair, ref to allergy, chest xray ordered  Orders:  -     XR Chest 2 View; Future  -     Ambulatory Referral to Allergy  -     montelukast (Singulair) 10 MG tablet; Take 1 tablet by mouth Every Night.  Dispense: 90 tablet; Refill: 0    4. Morbid (severe) obesity due to excess calories  Comments:  orders to start Wegovy 0.25 mg pending insurance approval, if approved, 1 month follow up  Orders:  -     Semaglutide-Weight Management (Wegovy) 0.25 MG/0.5ML solution auto-injector; Inject 0.5 mL under the skin into the appropriate area as directed 1 (One) Time Per Week.  Dispense: 2 mL; Refill: 0    5. Essential hypertension  Comments:  stable on micardis and atenolol, start wegovy to help with this  Orders:  -     Semaglutide-Weight Management (Wegovy) 0.25 MG/0.5ML solution auto-injector; Inject 0.5 mL under the skin into the appropriate area as directed 1 (One) Time Per Week.  Dispense: 2 mL; Refill: 0    6. At risk for cardiovascular event  Comments:  ordering wegovy to help with weight loss to help reduce risk, pending insurance approval.  Orders:  -      Semaglutide-Weight Management (Wegovy) 0.25 MG/0.5ML solution auto-injector; Inject 0.5 mL under the skin into the appropriate area as directed 1 (One) Time Per Week.  Dispense: 2 mL; Refill: 0         Tobacco Use: Medium Risk (7/17/2024)    Patient History     Smoking Tobacco Use: Former     Smokeless Tobacco Use: Never     Passive Exposure: Past       Follow Up     Return in about 1 month (around 8/17/2024) for weight check if wegovy is started.      Patient was given instructions and counseling regarding his condition or for health maintenance advice. Please see specific information pulled into the AVS if appropriate.   I have reviewed information obtained and documented by others and I have confirmed the accuracy of this documented note.    FROYLAN Thomas

## 2024-07-18 ENCOUNTER — TELEPHONE (OUTPATIENT)
Dept: FAMILY MEDICINE CLINIC | Facility: CLINIC | Age: 64
End: 2024-07-18
Payer: MEDICARE

## 2024-07-18 DIAGNOSIS — Z13.6 ENCOUNTER FOR ABDOMINAL AORTIC ANEURYSM (AAA) SCREENING: Primary | ICD-10-CM

## 2024-07-18 DIAGNOSIS — R79.89 ELEVATED LFTS: ICD-10-CM

## 2024-08-28 ENCOUNTER — HOSPITAL ENCOUNTER (OUTPATIENT)
Dept: ULTRASOUND IMAGING | Facility: HOSPITAL | Age: 64
Discharge: HOME | End: 2024-08-28
Payer: MEDICARE

## 2024-08-28 DIAGNOSIS — R79.89 ELEVATED LFTS: ICD-10-CM

## 2024-08-28 DIAGNOSIS — Z13.6 ENCOUNTER FOR ABDOMINAL AORTIC ANEURYSM (AAA) SCREENING: ICD-10-CM

## 2024-08-28 PROCEDURE — 76706 US ABDL AORTA SCREEN AAA: CPT

## 2024-08-28 PROCEDURE — 76705 ECHO EXAM OF ABDOMEN: CPT

## 2024-10-06 DIAGNOSIS — N13.8 BENIGN PROSTATIC HYPERPLASIA WITH URINARY OBSTRUCTION: ICD-10-CM

## 2024-10-06 DIAGNOSIS — N40.1 BENIGN PROSTATIC HYPERPLASIA WITH URINARY OBSTRUCTION: ICD-10-CM

## 2024-10-07 NOTE — TELEPHONE ENCOUNTER
LRX: 6/18/24   OV: 7/17/24  No f/u on file, (Pending wegovy approval) no approval/denial in chart

## 2024-10-08 RX ORDER — TAMSULOSIN HYDROCHLORIDE 0.4 MG/1
1 CAPSULE ORAL DAILY
Qty: 90 CAPSULE | Refills: 3 | Status: SHIPPED | OUTPATIENT
Start: 2024-10-08

## 2025-01-18 ENCOUNTER — PATIENT MESSAGE (OUTPATIENT)
Dept: FAMILY MEDICINE CLINIC | Facility: CLINIC | Age: 65
End: 2025-01-18
Payer: MEDICARE

## 2025-01-18 DIAGNOSIS — N52.9 ERECTILE DYSFUNCTION, UNSPECIFIED ERECTILE DYSFUNCTION TYPE: ICD-10-CM

## 2025-01-18 DIAGNOSIS — I10 ESSENTIAL HYPERTENSION: ICD-10-CM

## 2025-01-18 DIAGNOSIS — F41.9 ANXIETY: ICD-10-CM

## 2025-01-18 DIAGNOSIS — K21.9 GASTROESOPHAGEAL REFLUX DISEASE, UNSPECIFIED WHETHER ESOPHAGITIS PRESENT: Primary | ICD-10-CM

## 2025-01-18 DIAGNOSIS — R05.3 CHRONIC COUGH: ICD-10-CM

## 2025-01-21 RX ORDER — ALPRAZOLAM 0.5 MG
0.5 TABLET ORAL DAILY
OUTPATIENT
Start: 2025-01-21

## 2025-01-21 RX ORDER — TADALAFIL 5 MG/1
5 TABLET ORAL DAILY
OUTPATIENT
Start: 2025-01-21

## 2025-01-21 RX ORDER — ATENOLOL 25 MG/1
25 TABLET ORAL DAILY
OUTPATIENT
Start: 2025-01-21

## 2025-01-23 ENCOUNTER — OFFICE VISIT (OUTPATIENT)
Dept: FAMILY MEDICINE CLINIC | Facility: CLINIC | Age: 65
End: 2025-01-23
Payer: MEDICARE

## 2025-01-23 VITALS
SYSTOLIC BLOOD PRESSURE: 113 MMHG | DIASTOLIC BLOOD PRESSURE: 75 MMHG | BODY MASS INDEX: 43.1 KG/M2 | WEIGHT: 291 LBS | HEART RATE: 69 BPM | HEIGHT: 69 IN | OXYGEN SATURATION: 96 %

## 2025-01-23 DIAGNOSIS — Z23 NEEDS FLU SHOT: ICD-10-CM

## 2025-01-23 DIAGNOSIS — R79.89 ELEVATED LFTS: ICD-10-CM

## 2025-01-23 DIAGNOSIS — I10 ESSENTIAL HYPERTENSION: ICD-10-CM

## 2025-01-23 DIAGNOSIS — F41.9 ANXIETY: Primary | ICD-10-CM

## 2025-01-23 DIAGNOSIS — N52.9 ERECTILE DYSFUNCTION, UNSPECIFIED ERECTILE DYSFUNCTION TYPE: ICD-10-CM

## 2025-01-23 DIAGNOSIS — Z79.899 HIGH RISK MEDICATION USE: ICD-10-CM

## 2025-01-23 PROBLEM — K76.0 HEPATIC STEATOSIS: Status: ACTIVE | Noted: 2025-01-23

## 2025-01-23 LAB
AMPHET+METHAMPHET UR QL: NEGATIVE
AMPHETAMINE INTERNAL CONTROL: NORMAL
AMPHETAMINES UR QL: NEGATIVE
BARBITURATE INTERNAL CONTROL: NORMAL
BARBITURATES UR QL SCN: NEGATIVE
BENZODIAZ UR QL SCN: NEGATIVE
BENZODIAZEPINE INTERNAL CONTROL: NORMAL
BUPRENORPHINE INTERNAL CONTROL: NORMAL
BUPRENORPHINE SERPL-MCNC: NEGATIVE NG/ML
CANNABINOIDS SERPL QL: NEGATIVE
COCAINE INTERNAL CONTROL: NORMAL
COCAINE UR QL: NEGATIVE
EXPIRATION DATE: NORMAL
Lab: NORMAL
MDMA (ECSTASY) INTERNAL CONTROL: NORMAL
MDMA UR QL SCN: NEGATIVE
METHADONE INTERNAL CONTROL: NORMAL
METHADONE UR QL SCN: NEGATIVE
METHAMPHETAMINE INTERNAL CONTROL: NORMAL
MORPHINE INTERNAL CONTROL: NORMAL
MORPHINE/OPIATES SCREEN, URINE: NEGATIVE
OXYCODONE INTERNAL CONTROL: NORMAL
OXYCODONE UR QL SCN: NEGATIVE
PCP UR QL SCN: NEGATIVE
PHENCYCLIDINE INTERNAL CONTROL: NORMAL
THC INTERNAL CONTROL: NORMAL

## 2025-01-23 PROCEDURE — 90662 IIV NO PRSV INCREASED AG IM: CPT

## 2025-01-23 PROCEDURE — 99214 OFFICE O/P EST MOD 30 MIN: CPT

## 2025-01-23 PROCEDURE — 1159F MED LIST DOCD IN RCRD: CPT

## 2025-01-23 PROCEDURE — G0008 ADMIN INFLUENZA VIRUS VAC: HCPCS

## 2025-01-23 PROCEDURE — 3078F DIAST BP <80 MM HG: CPT

## 2025-01-23 PROCEDURE — 1160F RVW MEDS BY RX/DR IN RCRD: CPT

## 2025-01-23 PROCEDURE — G2211 COMPLEX E/M VISIT ADD ON: HCPCS

## 2025-01-23 PROCEDURE — 1125F AMNT PAIN NOTED PAIN PRSNT: CPT

## 2025-01-23 PROCEDURE — 3074F SYST BP LT 130 MM HG: CPT

## 2025-01-23 RX ORDER — ALPRAZOLAM 0.5 MG
0.5 TABLET ORAL DAILY PRN
Qty: 18 TABLET | Refills: 0 | Status: SHIPPED | OUTPATIENT
Start: 2025-01-23

## 2025-01-23 RX ORDER — TADALAFIL 5 MG/1
5 TABLET ORAL DAILY
Qty: 30 TABLET | Refills: 3 | Status: SHIPPED | OUTPATIENT
Start: 2025-01-23

## 2025-01-23 RX ORDER — ATENOLOL 25 MG/1
25 TABLET ORAL DAILY
Qty: 90 TABLET | Refills: 0 | Status: SHIPPED | OUTPATIENT
Start: 2025-01-23

## 2025-01-23 RX ORDER — CETIRIZINE HYDROCHLORIDE 10 MG/1
10 TABLET ORAL DAILY
COMMUNITY

## 2025-01-23 RX ORDER — EPINEPHRINE NASAL SOLUTION 1 MG/ML
0.3 SOLUTION NASAL DAILY
COMMUNITY

## 2025-01-23 RX ORDER — AZELASTINE 1 MG/ML
2 SPRAY, METERED NASAL 2 TIMES DAILY
COMMUNITY
Start: 2025-01-18

## 2025-01-23 NOTE — PROGRESS NOTES
Chief Complaint  Anxiety and Hypertension    SOPHIE Finnegan presents to [facility]    History of Present Illness  Patient is 65-year-old male who presents today for  6 month follow-up.     HTN-is currently on atenolol and Micardis.  Patient's blood pressure is well-controlled in office today at 113/75.  Denies any current chest pain, headache, shortness of breath, syncope.     GERD-patient is currently taking Protonix daily.  He is still having some reflux and chronic cough, he has been referred for EGD.      Anxiety-patient taking Xanax 0.5 mg once ever 2 weeks. Denies any adverse effects of medication. Wihtouthis medication he has decreased quality of life.       BPH and erectile dysfunction-patient is currently on Cialis and Flomax.  Patient reports that controls his symptoms well.      Patient is due labs. Orders placed at today's visit. Discussed with patient that these are fasting labs.      No other concerns or complaints at this time.  Patient denies any diarrhea, constipation, blood in stool, urinary urgency, frequency, or dysuria, chest pain, shortness of breath or syncope.      Pt would like his influenza vaccine today.        Past Medical History:   Diagnosis Date    Anxiety     BPH (benign prostatic hyperplasia)     Depression     Erectile dysfunction     GERD (gastroesophageal reflux disease)     Hypertension     Low back pain       History reviewed. No pertinent family history.   History reviewed. No pertinent surgical history.     Current Outpatient Medications:     ALPRAZolam (XANAX) 0.5 MG tablet, Take 1 tablet by mouth Daily As Needed for Anxiety., Disp: 18 tablet, Rfl: 0    atenolol (TENORMIN) 25 MG tablet, Take 1 tablet by mouth Daily., Disp: 90 tablet, Rfl: 0    azelastine (ASTELIN) 0.1 % nasal spray, Administer 2 sprays into the nostril(s) as directed by provider 2 (Two) Times a Day., Disp: , Rfl:     cetirizine (zyrTEC) 10 MG tablet, Take 1 tablet by  "mouth Daily., Disp: , Rfl:     EPINEPHrine (ADRENALIN) 0.1 % nasal solution, Administer 0.3 mL into the nostril(s) as directed by provider Daily., Disp: , Rfl:     Micardis HCT 40-12.5 MG per tablet, Take 1 tablet by mouth Daily., Disp: , Rfl:     pantoprazole (PROTONIX) 40 MG EC tablet, Take 1 tablet by mouth Daily., Disp: , Rfl:     tadalafil (CIALIS) 5 MG tablet, Take 1 tablet by mouth Daily., Disp: 30 tablet, Rfl: 3    tamsulosin (FLOMAX) 0.4 MG capsule 24 hr capsule, TAKE 1 CAPSULE DAILY, Disp: 90 capsule, Rfl: 3    montelukast (Singulair) 10 MG tablet, Take 1 tablet by mouth Every Night., Disp: 90 tablet, Rfl: 0    OBJECTIVE  Vital Signs:   /75 (BP Location: Left arm, Patient Position: Sitting, Cuff Size: Large Adult)   Pulse 69   Ht 175.3 cm (69\")   Wt 132 kg (291 lb)   SpO2 96%   BMI 42.97 kg/m²    Estimated body mass index is 42.97 kg/m² as calculated from the following:    Height as of this encounter: 175.3 cm (69\").    Weight as of this encounter: 132 kg (291 lb).     Wt Readings from Last 3 Encounters:   01/23/25 132 kg (291 lb)   07/17/24 129 kg (285 lb)   06/18/24 130 kg (286 lb 6.4 oz)     BP Readings from Last 3 Encounters:   01/23/25 113/75   07/17/24 129/72   06/18/24 135/82       Physical Exam  Vitals reviewed.   Constitutional:       General: He is not in acute distress.     Appearance: He is not ill-appearing.   HENT:      Head: Normocephalic and atraumatic.   Eyes:      Conjunctiva/sclera: Conjunctivae normal.   Cardiovascular:      Rate and Rhythm: Normal rate and regular rhythm.      Heart sounds: Normal heart sounds.   Pulmonary:      Effort: Pulmonary effort is normal.      Breath sounds: Normal breath sounds.   Musculoskeletal:      Cervical back: Normal range of motion.   Neurological:      Mental Status: He is alert and oriented to person, place, and time.   Psychiatric:         Mood and Affect: Mood normal.         Behavior: Behavior normal.         Thought Content: Thought " content normal.         Judgment: Judgment normal.          Result Review    Common labs          6/20/2024    06:37 7/16/2024    06:43   Common Labs   Glucose 104     BUN 8     Creatinine 0.93     Sodium 141     Potassium 4.1     Chloride 103     Calcium 9.3     Albumin 3.9  4.2    Total Bilirubin 0.6  0.6    Alkaline Phosphatase 59  59    AST (SGOT) 41  45    ALT (SGPT) 48  68    WBC 8.91     Hemoglobin 16.4     Hematocrit 48.2     Platelets 314     Total Cholesterol 162     Triglycerides 127     HDL Cholesterol 41     LDL Cholesterol  98     PSA 0.787         No Images in the past 120 days found..      The above data has been reviewed by FROYLAN Thomas 01/23/2025 08:11 EST.          Patient Care Team:  Lia Gillis APRN as PCP - General (Nurse Practitioner)            ASSESSMENT & PLAN    Diagnoses and all orders for this visit:    1. Anxiety (Primary)  Comments:  stabel on as needed xanax 0.5 mg, UDS and CC in office today, refill sent  Orders:  -     ALPRAZolam (XANAX) 0.5 MG tablet; Take 1 tablet by mouth Daily As Needed for Anxiety.  Dispense: 18 tablet; Refill: 0    2. High risk medication use  Comments:  UDS and CC for xanax  Overview:  UDS and CC for xanax    Orders:  -     POC 12 Panel Urine Drug Screen    3. Essential hypertension  Comments:  stable on atenolol and micardis, continue  Orders:  -     atenolol (TENORMIN) 25 MG tablet; Take 1 tablet by mouth Daily.  Dispense: 90 tablet; Refill: 0  -     Comprehensive Metabolic Panel; Future  -     Lipid Panel; Future    4. Needs flu shot  -     Fluzone High-Dose 65+yrs ([id number])    5. Erectile dysfunction, unspecified erectile dysfunction type  Comments:  stable on cialis, continue  Orders:  -     tadalafil (CIALIS) 5 MG tablet; Take 1 tablet by mouth Daily.  Dispense: 30 tablet; Refill: 3    6. Elevated LFTs  Comments:  repeat labs  Overview:  repeat labs    Orders:  -     Comprehensive Metabolic Panel; Future  -     Lipid Panel; Future          Tobacco Use: Medium Risk (1/23/2025)    Patient History     Smoking Tobacco Use: Former     Smokeless Tobacco Use: Never     Passive Exposure: Past       Follow Up     Return in about 6 months (around 7/23/2025) for Annual physical, or 3 months if needing xanax refill.      Patient was given instructions and counseling regarding his condition or for health maintenance advice. Please see specific information pulled into the AVS if appropriate.   I have reviewed information obtained and documented by others and I have confirmed the accuracy of this documented note.    Lia Gillis, APRN

## 2025-01-28 ENCOUNTER — LAB (OUTPATIENT)
Dept: LAB | Facility: HOSPITAL | Age: 65
End: 2025-01-28
Payer: MEDICARE

## 2025-01-28 DIAGNOSIS — I10 ESSENTIAL HYPERTENSION: ICD-10-CM

## 2025-01-28 DIAGNOSIS — R79.89 ELEVATED LFTS: ICD-10-CM

## 2025-01-28 LAB
ALBUMIN SERPL-MCNC: 4 G/DL (ref 3.5–5.2)
ALBUMIN/GLOB SERPL: 1.3 G/DL
ALP SERPL-CCNC: 66 U/L (ref 39–117)
ALT SERPL W P-5'-P-CCNC: 90 U/L (ref 1–41)
ANION GAP SERPL CALCULATED.3IONS-SCNC: 9.7 MMOL/L (ref 5–15)
AST SERPL-CCNC: 75 U/L (ref 1–40)
BILIRUB SERPL-MCNC: 0.8 MG/DL (ref 0–1.2)
BUN SERPL-MCNC: 11 MG/DL (ref 8–23)
BUN/CREAT SERPL: 11.5 (ref 7–25)
CALCIUM SPEC-SCNC: 9.8 MG/DL (ref 8.6–10.5)
CHLORIDE SERPL-SCNC: 99 MMOL/L (ref 98–107)
CHOLEST SERPL-MCNC: 176 MG/DL (ref 0–200)
CO2 SERPL-SCNC: 27.3 MMOL/L (ref 22–29)
CREAT SERPL-MCNC: 0.96 MG/DL (ref 0.76–1.27)
EGFRCR SERPLBLD CKD-EPI 2021: 87.2 ML/MIN/1.73
GLOBULIN UR ELPH-MCNC: 3.2 GM/DL
GLUCOSE SERPL-MCNC: 111 MG/DL (ref 65–99)
HDLC SERPL-MCNC: 37 MG/DL (ref 40–60)
LDLC SERPL CALC-MCNC: 106 MG/DL (ref 0–100)
LDLC/HDLC SERPL: 2.74 {RATIO}
POTASSIUM SERPL-SCNC: 4.5 MMOL/L (ref 3.5–5.2)
PROT SERPL-MCNC: 7.2 G/DL (ref 6–8.5)
SODIUM SERPL-SCNC: 136 MMOL/L (ref 136–145)
TRIGL SERPL-MCNC: 188 MG/DL (ref 0–150)
VLDLC SERPL-MCNC: 33 MG/DL (ref 5–40)

## 2025-01-28 PROCEDURE — 36415 COLL VENOUS BLD VENIPUNCTURE: CPT

## 2025-01-28 PROCEDURE — 80053 COMPREHEN METABOLIC PANEL: CPT

## 2025-01-28 PROCEDURE — 80061 LIPID PANEL: CPT

## 2025-01-30 ENCOUNTER — TELEPHONE (OUTPATIENT)
Dept: GASTROENTEROLOGY | Facility: CLINIC | Age: 65
End: 2025-01-30
Payer: MEDICARE

## 2025-01-30 DIAGNOSIS — R79.89 ELEVATED LFTS: Primary | ICD-10-CM

## 2025-01-30 DIAGNOSIS — K76.0 HEPATIC STEATOSIS: ICD-10-CM

## 2025-01-30 DIAGNOSIS — R73.09 ELEVATED GLUCOSE: Primary | ICD-10-CM

## 2025-01-30 NOTE — TELEPHONE ENCOUNTER
S/W pt. He call to get schedule from a referral and I offered him Monday Feb 3 or Tuesday Feb 4 for appt. Pt is going to call us back to schedule one  of them dates.

## 2025-02-03 ENCOUNTER — OFFICE VISIT (OUTPATIENT)
Dept: GASTROENTEROLOGY | Facility: CLINIC | Age: 65
End: 2025-02-03
Payer: MEDICARE

## 2025-02-03 ENCOUNTER — LAB (OUTPATIENT)
Dept: LAB | Facility: HOSPITAL | Age: 65
End: 2025-02-03
Payer: MEDICARE

## 2025-02-03 VITALS
WEIGHT: 299.2 LBS | BODY MASS INDEX: 44.18 KG/M2 | SYSTOLIC BLOOD PRESSURE: 104 MMHG | DIASTOLIC BLOOD PRESSURE: 80 MMHG | HEART RATE: 111 BPM | OXYGEN SATURATION: 95 %

## 2025-02-03 DIAGNOSIS — R93.2 ABNORMAL FINDINGS ON DIAGNOSTIC IMAGING OF LIVER AND BILIARY TRACT: ICD-10-CM

## 2025-02-03 DIAGNOSIS — R13.19 ESOPHAGEAL DYSPHAGIA: Primary | ICD-10-CM

## 2025-02-03 DIAGNOSIS — R11.0 NAUSEA: ICD-10-CM

## 2025-02-03 DIAGNOSIS — K76.0 HEPATIC STEATOSIS: ICD-10-CM

## 2025-02-03 DIAGNOSIS — R73.09 ELEVATED GLUCOSE: ICD-10-CM

## 2025-02-03 DIAGNOSIS — R14.2 BELCHING: ICD-10-CM

## 2025-02-03 DIAGNOSIS — R79.89 ELEVATED LFTS: ICD-10-CM

## 2025-02-03 DIAGNOSIS — R12 HEARTBURN: ICD-10-CM

## 2025-02-03 DIAGNOSIS — R94.5 ABNORMAL RESULTS OF LIVER FUNCTION STUDIES: ICD-10-CM

## 2025-02-03 DIAGNOSIS — I10 ESSENTIAL (PRIMARY) HYPERTENSION: ICD-10-CM

## 2025-02-03 LAB — HBA1C MFR BLD: 5.9 % (ref 4.8–5.6)

## 2025-02-03 PROCEDURE — 36415 COLL VENOUS BLD VENIPUNCTURE: CPT

## 2025-02-03 PROCEDURE — 1159F MED LIST DOCD IN RCRD: CPT

## 2025-02-03 PROCEDURE — 83036 HEMOGLOBIN GLYCOSYLATED A1C: CPT

## 2025-02-03 PROCEDURE — 1160F RVW MEDS BY RX/DR IN RCRD: CPT

## 2025-02-03 PROCEDURE — 3074F SYST BP LT 130 MM HG: CPT

## 2025-02-03 PROCEDURE — 99214 OFFICE O/P EST MOD 30 MIN: CPT

## 2025-02-03 PROCEDURE — 3079F DIAST BP 80-89 MM HG: CPT

## 2025-02-03 RX ORDER — FLUTICASONE PROPIONATE 50 MCG
SPRAY, SUSPENSION (ML) NASAL
COMMUNITY

## 2025-02-03 NOTE — PATIENT INSTRUCTIONS
Advised patient to maintain a healthy lifestyle: weight loss of 10%, cutting back on carbs, sugars, and fried fatty foods, limiting intake of soda and sugary drinks, and abstain from alcohol. Recommend 2 to 3 cups of black coffee a day. Advise patient to go on daily walks with 10,000 steps daily (as recommended for patients with NAFLD/WILSON).

## 2025-02-03 NOTE — PROGRESS NOTES
Chief Complaint     Elevated LFTs, Hepatic steatosis, EGD (Pt would like to discuss an EGD), Nausea (Pt will have nausea after eating ), and Chronic cough (Pt states he will have a chronic cough after he eats )    Patient or patient representative verbalized consent for the use of Ambient Listening during the visit with  FROYLAN Leonard for chart documentation. 2/3/2025  13:48 EST      History of Present Illness     Dennis Finnegan is a 65 y.o. male who presents to [facility] GA[facility]OLOG[facility] on referral from No ref. provider found for a gastroenterology evaluation of elevated LFTs and hepatic steatosis, nausea and belching post prandially.      History of Present Illness  The patient is a 65-year-old male who presents for evaluation of elevated liver function tests (LFTs) and hepatic steatosis.    He was referred by nurse practitioner, Lia Gillis, for further evaluation of his elevated liver function tests (LFTs) and hepatic steatosis. He reports experiencing nausea, coughing postprandially, and belching. He has a lifelong history of nausea. He also experiences fatigue and frequent coughing. Patient reports trouble swallowing and experiences food getting stuck in his esophagus. He describes a sensation of food impaction in the mid-chest region. This sensation is predominantly associated with solid foods and has been present since the onset of his symptoms. Occasionally, he experiences vomiting and regurgitation but has not attempted fluid intake to alleviate these symptoms. He reports no specific food triggers for these symptoms. No unintentional weight loss or hematemesis. Despite a long-term history of acid reflux, managed with pantoprazole for approximately 20 years, he has not experienced similar symptoms in the past. He has sought consultation from an allergist due to persistent nausea and dysphagia.  His bowel movements are regular, occurring once or twice daily, with formed to  mushy consistency. Denies hematochezia or melena. He reports no abdominal pain. He has not been informed about his fatty liver diagnosis. He reports no history of intravenous drug use, unprofessional tattoos, hepatitis exposure, or blood transfusions.     SOCIAL HISTORY  He quit smoking 27 years ago. He drinks alcohol occasionally.    FAMILY HISTORY  He reports no family history of liver disease, colon cancer, or colon polyps.         History      Past Medical History:   Diagnosis Date    Anxiety     BPH (benign prostatic hyperplasia)     Depression     Erectile dysfunction     GERD (gastroesophageal reflux disease)     Hypertension     Low back pain        Past Surgical History:   Procedure Laterality Date    COLONOSCOPY         Family History   Problem Relation Age of Onset    Colon cancer Neg Hx         Current Medications        Current Outpatient Medications:     ALPRAZolam (XANAX) 0.5 MG tablet, Take 1 tablet by mouth Daily As Needed for Anxiety., Disp: 18 tablet, Rfl: 0    atenolol (TENORMIN) 25 MG tablet, Take 1 tablet by mouth Daily., Disp: 90 tablet, Rfl: 0    azelastine (ASTELIN) 0.1 % nasal spray, Administer 2 sprays into the nostril(s) as directed by provider 2 (Two) Times a Day., Disp: , Rfl:     cetirizine (zyrTEC) 10 MG tablet, Take 1 tablet by mouth Daily., Disp: , Rfl:     fluticasone (FLONASE) 50 MCG/ACT nasal spray, , Disp: , Rfl:     Micardis HCT 40-12.5 MG per tablet, Take 1 tablet by mouth Daily., Disp: , Rfl:     pantoprazole (PROTONIX) 40 MG EC tablet, Take 1 tablet by mouth Daily., Disp: , Rfl:     tadalafil (CIALIS) 5 MG tablet, Take 1 tablet by mouth Daily., Disp: 30 tablet, Rfl: 3    tamsulosin (FLOMAX) 0.4 MG capsule 24 hr capsule, TAKE 1 CAPSULE DAILY, Disp: 90 capsule, Rfl: 3    EPINEPHrine (ADRENALIN) 0.1 % nasal solution, Administer 0.3 mL into the nostril(s) as directed by provider Daily., Disp: , Rfl:     montelukast (Singulair) 10 MG tablet, Take 1 tablet by mouth Every Night.  "(Patient not taking: Reported on 2/3/2025), Disp: 90 tablet, Rfl: 0     Allergies     No Known Allergies    Social History       Social History     Social History Narrative    Not on file       Immunizations     Immunization:  Immunization History   Administered Date(s) Administered    COVID-19 (MODERNA) 1st,2nd,3rd Dose Monovalent 04/19/2021, 05/19/2021    Fluzone High-Dose 65+YRS 01/23/2025    Pneumococcal Conjugate 20-Valent (PCV20) 06/18/2024    Shingrix 12/17/2024    Tdap 12/17/2024          Objective     Objective     Vital Signs:   /80 (BP Location: Right arm, Patient Position: Sitting, Cuff Size: Large Adult)   Pulse 111   Wt 136 kg (299 lb 3.2 oz)   SpO2 95%   BMI 44.18 kg/m²       Physical Exam  HENT:      Head: Normocephalic.   Cardiovascular:      Rate and Rhythm: Normal rate.   Musculoskeletal:         General: Normal range of motion.   Neurological:      General: No focal deficit present.      Mental Status: He is alert.   Psychiatric:         Mood and Affect: Mood normal.                 Results      Result Review :   The following data was reviewed by: FROYLAN Leonard on 02/03/2025:    Lab Results - Last 18 Months   Lab Units 06/20/24  0637   WBC 10*3/mm3 8.91   HEMOGLOBIN g/dL 16.4   MCV fL 94.3   PLATELETS 10*3/mm3 314         Lab Results - Last 18 Months   Lab Units 01/28/25  0813 06/20/24  0637   BUN mg/dL 11 8   CREATININE mg/dL 0.96 0.93   SODIUM mmol/L 136 141   POTASSIUM mmol/L 4.5 4.1   CHLORIDE mmol/L 99 103   CO2 mmol/L 27.3 25.2   GLUCOSE mg/dL 111* 104*      No results for input(s): \"PROTIME\", \"INR\", \"PTT\" in the last 15795 hours.  Lab Results - Last 18 Months   Lab Units 01/28/25  0813 07/16/24  0643 06/20/24  0637   AST (SGOT) U/L 75* 45* 41*   ALT (SGPT) U/L 90* 68* 48*   ALK PHOS U/L 66 59 59   BILIRUBIN mg/dL 0.8 0.6 0.6   BILIRUBIN DIRECT mg/dL  --  <0.2  --    TOTAL PROTEIN g/dL 7.2 6.9 6.6   ALBUMIN g/dL 4.0 4.2 3.9      No results for input(s): \"IRON\", " "\"TRANSFERRIN\", \"TIBC\", \"FERRITIN\", \"OYHHZJRW01\", \"FOLATE\" in the last 55766 hours.  No results for input(s): \"HAV\", \"HEPAIGM\", \"HEPBIGM\", \"HEPBCAB\", \"HBEAG\", \"HEPCAB\" in the last 84551 hours.    No Images in the past 120 days found..    Results  Laboratory Studies  AST was elevated at 75. ALT was elevated at 90.    Imaging  Ultrasound showed fatty liver.             Assessment and Plan        Assessment and Plan    Diagnoses and all orders for this visit:    1. Esophageal dysphagia (Primary)  -     Case Request; Standing  -     Case Request    2. Heartburn    3. Hepatic steatosis  -     AFP Tumor Marker; Future  -     Alpha - 1 - Antitrypsin Phenotype; Future  -     ANTHONY; Future  -     Hepatic Function Panel; Future  -     Hepatitis Panel, Acute; Future  -     Iron Profile; Future  -     Ferritin; Future  -     Copper, Serum; Future  -     Protime-INR; Future  -     Ceruloplasmin; Future  -     Mitochondrial Antibodies, M2; Future  -     Anti-Smooth Muscle Antibody Titer; Future  -     EDWIN + PE; Future  -     WILSON Fibrosure Plus; Future    4. BMI 40.0-44.9, adult    5. Nausea    6. Belching    7. Elevated LFTs    8. Abnormal findings on diagnostic imaging of liver and biliary tract  -     AFP Tumor Marker; Future    9. Abnormal results of liver function studies  -     Hepatitis Panel, Acute; Future    10. Essential (primary) hypertension  -     WILSON Fibrosure Plus; Future    Other orders  -     Follow Anesthesia Guidelines / Protocol; Standing  -     Follow Anesthesia Guidelines / Protocol; Future  -     Verify NPO; Standing          Assessment & Plan  1. Hepatic steatosis.  His hepatic steatosis has shown progression from July 2024 to January 2025, with an increase in ALT from 68 to 90 and AST from 45 to 75.  A detailed explanation about fatty liver was provided, including its causes, symptoms, and treatment options. He was encouraged to reduce his sugar and carbohydrate intake, specifically pastas, breads, and " rice, and to work towards a 10 percent weight loss over the next year.  An order for WILSON FibroSure test was placed, and he was instructed to fast prior to the test. He was provided with reading material on fatty liver. A follow-up appointment was scheduled for 6 months later to assess his weight loss progress and to conduct lab tests and an ultrasound of the liver.    2. Dysphagia.  He reported difficulty swallowing, particularly with solid foods, and occasional regurgitation. He was advised to ensure his food is adequately moistened and cut into small pieces to prevent choking. An EGD with Dr. Coles was ordered to investigate the cause of his dysphagia. The procedure, risks, and benefits were explained, and he consented to the procedure.He was provided with reading material on dysphagia.    3. GERD.  He was advised to abstain from smoking, alcohol consumption, caffeine intake, chocolates, greasy meals, and spaghetti with red sauces. He was also recommended to avoid lying down for a minimum of 3 hours post meals. He was advised to continue his Protonix regimen. A comprehensive discussion regarding the potential long-term side effects of proton pump inhibitors was conducted.    Follow-up  The patient will follow up in 6 months.    PROCEDURE  Colonoscopy was performed in 2017.      ESOPHAGOGASTRODUODENOSCOPY; A flexible tube that has a camera and light at the end will pass through the oral cavity into the esophagus (food tube), stomach and upper part of the small bowel. (N/A)      Follow Up        Follow Up   Return for Follow up after EGD, GERD.    Continue Protonix 40 mg daily on empty stomach for GERD.    I have recommended that the patient undergo further evaluation with an EGD due to esophageal dysphagia.  I have discussed this procedure in detail with the patient.  I have discussed the risks, benefits, and alternatives.  I have discussed the risk of anesthesia, bleeding and perforation.  Patient understands  these risks, benefits, and alternatives and wishes to proceed.  I will schedule him at his earliest convenience.    Hepatic lab workup ordered to determine cause of fatty liver. Differential diagnosis include but are not limited to viral hepatitis, autoimmune hepatitis, NAFLD, toxic hepatitis, vascular causes such as Budd Chiari or CHF, or hereditary causes such as hemochromatosis, alpha 1 antitrypsin deficiency, or Vivek disease. Follow up Liver US  and Labs in 6 months. Will order FibroScan and WILSON Fibrosure as noninvasive testing for fibrosis and fatty liver.     Patient was given instructions and counseling regarding his condition or for health maintenance advice. Please see specific information pulled into the AVS if appropriate.

## 2025-02-06 ENCOUNTER — TELEPHONE (OUTPATIENT)
Dept: GASTROENTEROLOGY | Facility: CLINIC | Age: 65
End: 2025-02-06
Payer: MEDICARE

## 2025-02-06 NOTE — TELEPHONE ENCOUNTER
Returned pt vm and advised him that he does need to fast for the labs that Miladis has ordered. Pt verbalized understanding.

## 2025-02-07 ENCOUNTER — LAB (OUTPATIENT)
Dept: LAB | Facility: HOSPITAL | Age: 65
End: 2025-02-07
Payer: MEDICARE

## 2025-02-07 DIAGNOSIS — R93.2 ABNORMAL FINDINGS ON DIAGNOSTIC IMAGING OF LIVER AND BILIARY TRACT: ICD-10-CM

## 2025-02-07 DIAGNOSIS — K76.0 HEPATIC STEATOSIS: ICD-10-CM

## 2025-02-07 DIAGNOSIS — R94.5 ABNORMAL RESULTS OF LIVER FUNCTION STUDIES: ICD-10-CM

## 2025-02-07 DIAGNOSIS — I10 ESSENTIAL (PRIMARY) HYPERTENSION: ICD-10-CM

## 2025-02-07 LAB
ALBUMIN SERPL-MCNC: 4.1 G/DL (ref 3.5–5.2)
ALP SERPL-CCNC: 56 U/L (ref 39–117)
ALPHA-FETOPROTEIN: 4.45 NG/ML (ref 0–8.3)
ALT SERPL W P-5'-P-CCNC: 92 U/L (ref 1–41)
AST SERPL-CCNC: 62 U/L (ref 1–40)
BILIRUB CONJ SERPL-MCNC: 0.3 MG/DL (ref 0–0.3)
BILIRUB INDIRECT SERPL-MCNC: 0.6 MG/DL
BILIRUB SERPL-MCNC: 0.9 MG/DL (ref 0–1.2)
CERULOPLASMIN SERPL-MCNC: 22 MG/DL (ref 16–31)
FERRITIN SERPL-MCNC: 299 NG/ML (ref 30–400)
HAV IGM SERPL QL IA: NORMAL
HBV CORE IGM SERPL QL IA: NORMAL
HBV SURFACE AG SERPL QL IA: NORMAL
HCV AB SER QL: NORMAL
INR PPP: 1.05 (ref 0.86–1.15)
IRON 24H UR-MRATE: 167 MCG/DL (ref 59–158)
IRON SATN MFR SERPL: 37 % (ref 20–50)
PROT SERPL-MCNC: 7.4 G/DL (ref 6–8.5)
PROTHROMBIN TIME: 14.1 SECONDS (ref 11.8–14.9)
TIBC SERPL-MCNC: 457 MCG/DL (ref 298–536)
TRANSFERRIN SERPL-MCNC: 307 MG/DL (ref 200–360)

## 2025-02-07 PROCEDURE — 86015 ACTIN ANTIBODY EACH: CPT

## 2025-02-07 PROCEDURE — 83540 ASSAY OF IRON: CPT

## 2025-02-07 PROCEDURE — 82784 ASSAY IGA/IGD/IGG/IGM EACH: CPT

## 2025-02-07 PROCEDURE — 85610 PROTHROMBIN TIME: CPT

## 2025-02-07 PROCEDURE — 82465 ASSAY BLD/SERUM CHOLESTEROL: CPT

## 2025-02-07 PROCEDURE — 84478 ASSAY OF TRIGLYCERIDES: CPT

## 2025-02-07 PROCEDURE — 80076 HEPATIC FUNCTION PANEL: CPT

## 2025-02-07 PROCEDURE — 36415 COLL VENOUS BLD VENIPUNCTURE: CPT

## 2025-02-07 PROCEDURE — 82105 ALPHA-FETOPROTEIN SERUM: CPT

## 2025-02-07 PROCEDURE — 82172 ASSAY OF APOLIPOPROTEIN: CPT

## 2025-02-07 PROCEDURE — 84165 PROTEIN E-PHORESIS SERUM: CPT

## 2025-02-07 PROCEDURE — 82390 ASSAY OF CERULOPLASMIN: CPT

## 2025-02-07 PROCEDURE — 82103 ALPHA-1-ANTITRYPSIN TOTAL: CPT

## 2025-02-07 PROCEDURE — 82728 ASSAY OF FERRITIN: CPT

## 2025-02-07 PROCEDURE — 86038 ANTINUCLEAR ANTIBODIES: CPT

## 2025-02-07 PROCEDURE — 84466 ASSAY OF TRANSFERRIN: CPT

## 2025-02-07 PROCEDURE — 84155 ASSAY OF PROTEIN SERUM: CPT

## 2025-02-07 PROCEDURE — 80074 ACUTE HEPATITIS PANEL: CPT

## 2025-02-07 PROCEDURE — 83010 ASSAY OF HAPTOGLOBIN QUANT: CPT

## 2025-02-07 PROCEDURE — 86381 MITOCHONDRIAL ANTIBODY EACH: CPT

## 2025-02-07 PROCEDURE — 83883 ASSAY NEPHELOMETRY NOT SPEC: CPT

## 2025-02-07 PROCEDURE — 82104 ALPHA-1-ANTITRYPSIN PHENO: CPT

## 2025-02-07 PROCEDURE — 82525 ASSAY OF COPPER: CPT

## 2025-02-07 PROCEDURE — 86334 IMMUNOFIX E-PHORESIS SERUM: CPT

## 2025-02-07 PROCEDURE — 82977 ASSAY OF GGT: CPT

## 2025-02-07 PROCEDURE — 82947 ASSAY GLUCOSE BLOOD QUANT: CPT

## 2025-02-08 LAB
MITOCHONDRIA M2 IGG SER-ACNC: <20 UNITS (ref 0–20)
SMA IGG SER-ACNC: 4 UNITS (ref 0–19)

## 2025-02-10 LAB — ANA SER QL: NEGATIVE

## 2025-02-11 LAB
A2 MACROGLOB SERPL-MCNC: 522 MG/DL (ref 110–276)
ALBUMIN SERPL ELPH-MCNC: 3.8 G/DL (ref 2.9–4.4)
ALBUMIN/GLOB SERPL: 1.2 {RATIO} (ref 0.7–1.7)
ALPHA1 GLOB SERPL ELPH-MCNC: 0.3 G/DL (ref 0–0.4)
ALPHA2 GLOB SERPL ELPH-MCNC: 1 G/DL (ref 0.4–1)
ALT SERPL W P-5'-P-CCNC: 116 IU/L (ref 0–55)
APO A-I SERPL-MCNC: 133 MG/DL (ref 101–178)
AST SERPL W P-5'-P-CCNC: 68 IU/L (ref 0–40)
B-GLOBULIN SERPL ELPH-MCNC: 1.2 G/DL (ref 0.7–1.3)
BILIRUB SERPL-MCNC: 0.7 MG/DL (ref 0–1.2)
CHOLEST SERPL-MCNC: 148 MG/DL (ref 100–199)
FIBROSIS SCORING:: ABNORMAL
FIBROSIS STAGE SERPL QL: ABNORMAL
GAMMA GLOB SERPL ELPH-MCNC: 0.8 G/DL (ref 0.4–1.8)
GGT SERPL-CCNC: 43 IU/L (ref 0–65)
GLOBULIN SER-MCNC: 3.2 G/DL (ref 2.2–3.9)
GLUCOSE SERPL-MCNC: 108 MG/DL (ref 70–99)
HAPTOGLOB SERPL-MCNC: 168 MG/DL (ref 32–363)
IGA SERPL-MCNC: 266 MG/DL (ref 61–437)
IGG SERPL-MCNC: 870 MG/DL (ref 603–1613)
IGM SERPL-MCNC: 92 MG/DL (ref 20–172)
INTERPRETATION SERPL IEP-IMP: NORMAL
LABORATORY COMMENT REPORT: ABNORMAL
LABORATORY COMMENT REPORT: NORMAL
LIVER FIBR SCORE SERPL CALC.FIBROSURE: 0.81 (ref 0–0.21)
LIVER STEATOSIS GRADE SERPL QL: ABNORMAL
LIVER STEATOSIS SCORE SERPL: 0.49 (ref 0–0.4)
M PROTEIN SERPL ELPH-MCNC: NORMAL G/DL
NASH GRADE SERPL QL: ABNORMAL
NASH INTERPRETATION SERPL-IMP: ABNORMAL
NASH SCORE SERPL: 0.93 (ref 0–0.25)
NASH SCORING: ABNORMAL
PROT SERPL-MCNC: 7 G/DL (ref 6–8.5)
STEATOSIS SCORING: ABNORMAL
TEST PERFORMANCE INFO SPEC: ABNORMAL
TEST PERFORMANCE INFO SPEC: ABNORMAL
TRIGL SERPL-MCNC: 133 MG/DL (ref 0–149)

## 2025-02-12 LAB — COPPER SERPL-MCNC: 90 UG/DL (ref 69–132)

## 2025-02-20 ENCOUNTER — TELEPHONE (OUTPATIENT)
Dept: GASTROENTEROLOGY | Facility: CLINIC | Age: 65
End: 2025-02-20
Payer: MEDICARE

## 2025-02-20 LAB
A1AT PHENOTYP SERPL IFE: NORMAL
A1AT SERPL-MCNC: 155 MG/DL (ref 101–187)

## 2025-02-20 NOTE — TELEPHONE ENCOUNTER
Miladis went over results and recommendations with pt. Pt verbalized understanding.  Per Miladis I scheduled pt for an US Liver and scheduled a sooner f/u appt. Pt agreeable to date and time of US and the f/u appt after.

## 2025-02-20 NOTE — TELEPHONE ENCOUNTER
----- Message from Miladis Umanzor sent at 2/20/2025  3:27 PM EST -----  I have reviewed the patient's most recent liver work-up which is normal ruling out underlying hepatitis, autoimmune, and genetic cause for elevated liver enzymes. Elevated liver enzyme and fatty liver on ultrasound is likely due to diet. Advise patient to maintain a healthy lifestyle: weight loss of 10%, cutting back on carbs, sugars, and fried fatty foods, limiting intake of soda and sugary drinks, and abstain from alcohol.     ALT elevated at 92, AST elevated at 62.  Iron level elevated at 167.

## 2025-02-28 ENCOUNTER — HOSPITAL ENCOUNTER (OUTPATIENT)
Dept: ULTRASOUND IMAGING | Facility: HOSPITAL | Age: 65
Discharge: HOME | End: 2025-02-28
Payer: MEDICARE

## 2025-02-28 DIAGNOSIS — K76.0 HEPATIC STEATOSIS: ICD-10-CM

## 2025-02-28 DIAGNOSIS — R79.89 ELEVATED LFTS: ICD-10-CM

## 2025-02-28 PROCEDURE — 76705 ECHO EXAM OF ABDOMEN: CPT

## 2025-03-10 ENCOUNTER — OFFICE VISIT (OUTPATIENT)
Dept: GASTROENTEROLOGY | Facility: CLINIC | Age: 65
End: 2025-03-10
Payer: MEDICARE

## 2025-03-10 VITALS
OXYGEN SATURATION: 95 % | DIASTOLIC BLOOD PRESSURE: 59 MMHG | HEART RATE: 93 BPM | SYSTOLIC BLOOD PRESSURE: 90 MMHG | BODY MASS INDEX: 43.36 KG/M2 | WEIGHT: 293.6 LBS

## 2025-03-10 DIAGNOSIS — K76.0 HEPATIC STEATOSIS: Primary | ICD-10-CM

## 2025-03-10 DIAGNOSIS — R79.89 ELEVATED LFTS: ICD-10-CM

## 2025-03-10 DIAGNOSIS — E83.19 IRON EXCESS: ICD-10-CM

## 2025-03-10 PROCEDURE — 3074F SYST BP LT 130 MM HG: CPT

## 2025-03-10 PROCEDURE — 1159F MED LIST DOCD IN RCRD: CPT

## 2025-03-10 PROCEDURE — 99213 OFFICE O/P EST LOW 20 MIN: CPT

## 2025-03-10 PROCEDURE — 1160F RVW MEDS BY RX/DR IN RCRD: CPT

## 2025-03-10 PROCEDURE — 3078F DIAST BP <80 MM HG: CPT

## 2025-03-10 NOTE — H&P (VIEW-ONLY)
Chief Complaint     Follow-up (fatty liver, liver US f/u/)    Patient or patient representative verbalized consent for the use of Ambient Listening during the visit with  FROYLAN Leonard for chart documentation. 3/11/2025  14:55 EDT      History of Present Illness     Dennis Finnegan is a 65 y.o. male who presents to [facility]STROENTEROLOG[facility] for follow-up of fatty liver.    History of Present Illness  The patient is a 65-year-old male who presents for evaluation of fatty liver and elevated iron levels.    He has been making efforts to reduce his sugar intake, which he finds challenging. He reports no pain in the right upper quadrant, jaundice, or easy bleeding. However, he does note a tendency to bruise easily, particularly after blood draws, a condition he has experienced throughout his life. He has lost approximately 6 pounds since his last visit, with his weight decreasing from 299 to 293 pounds. He reports no presence of blood in his stool, black tarry stools, abdominal pain, nausea, or vomiting. An EGD is scheduled for next Wednesday.    He recalls a previous discussion regarding elevated iron levels in his blood and was advised to consult his primary care physician. He expresses concern about potential water contamination at his residence. His diet is notably lacking in leafy vegetables and salads. He has never received a blood transfusion and has no history of thalassemia or sickle cell disease. He has not undergone any iron infusions. He has abstained from alcohol for over a month, following a period of daily consumption. He is unaware of any family history of hemochromatosis. He has not taken a multivitamin in several months due to running out but had been taking it consistently for many years prior.    SOCIAL HISTORY  The patient has not had a drink in over a month but previously drank alcohol daily.    FAMILY HISTORY  The patient does not report any family history of  hemochromatosis that he is aware of.    2/28/2025 Ultrasound of Liver: Diffusely increased echogenicity of the liver parenchyma suggesting steatosis. No evidence of gallstones or gallbladder inflammation.       2/20/2025 Hepatic lab work up: I have reviewed the patient's most recent liver work-up which is normal ruling out underlying hepatitis, autoimmune, and genetic cause for elevated liver enzymes. Elevated liver enzyme and fatty liver on ultrasound is likely due to diet. Advise patient to maintain a healthy lifestyle: weight loss of 10%, cutting back on carbs, sugars, and fried fatty foods, limiting intake of soda and sugary drinks, and abstain from alcohol. ALT elevated at 92, AST elevated at 62.  Iron level elevated at 167.     2/3/2025 Initial office visit: The patient is a 65-year-old male who presents for evaluation of elevated liver function tests (LFTs) and hepatic steatosis. He was referred by nurse practitioner, Lia Gillis, for further evaluation of his elevated liver function tests (LFTs) and hepatic steatosis. He reports experiencing nausea, coughing postprandially, and belching. He has a lifelong history of nausea. He also experiences fatigue and frequent coughing. Patient reports trouble swallowing and experiences food getting stuck in his esophagus. He describes a sensation of food impaction in the mid-chest region. This sensation is predominantly associated with solid foods and has been present since the onset of his symptoms. Occasionally, he experiences vomiting and regurgitation but has not attempted fluid intake to alleviate these symptoms. He reports no specific food triggers for these symptoms. No unintentional weight loss or hematemesis. Despite a long-term history of acid reflux, managed with pantoprazole for approximately 20 years, he has not experienced similar symptoms in the past. He has sought consultation from an allergist due to persistent nausea and dysphagia.  His bowel  movements are regular, occurring once or twice daily, with formed to mushy consistency. Denies hematochezia or melena. He reports no abdominal pain. He has not been informed about his fatty liver diagnosis. He reports no history of intravenous drug use, unprofessional tattoos, hepatitis exposure, or blood transfusions.     Continue Protonix 40 mg daily on empty stomach for GERD. I have recommended that the patient undergo further evaluation with an EGD due to esophageal dysphagia. Hepatic lab workup ordered to determine cause of fatty liver. Differential diagnosis include but are not limited to viral hepatitis, autoimmune hepatitis, NAFLD, toxic hepatitis, vascular causes such as Budd Chiari or CHF, or hereditary causes such as hemochromatosis, alpha 1 antitrypsin deficiency, or Vivek disease. Follow up Liver US and Labs in 6 months. Will order FibroScan and WILSON Fibrosure as noninvasive testing for fibrosis and fatty liver.      SOCIAL HISTORY  He quit smoking 27 years ago. He drinks alcohol occasionally.     FAMILY HISTORY  He reports no family history of liver disease, colon cancer, or colon polyps.       History      Past Medical History:   Diagnosis Date    Anxiety     BPH (benign prostatic hyperplasia)     Depression     Erectile dysfunction     GERD (gastroesophageal reflux disease)     Hypertension     Low back pain      Past Surgical History:   Procedure Laterality Date    COLONOSCOPY       Family History   Problem Relation Age of Onset    Colon cancer Neg Hx         Current Medications       Current Outpatient Medications:     ALPRAZolam (XANAX) 0.5 MG tablet, Take 1 tablet by mouth Daily As Needed for Anxiety., Disp: 18 tablet, Rfl: 0    atenolol (TENORMIN) 25 MG tablet, Take 1 tablet by mouth Daily., Disp: 90 tablet, Rfl: 0    azelastine (ASTELIN) 0.1 % nasal spray, Administer 2 sprays into the nostril(s) as directed by provider 2 (Two) Times a Day., Disp: , Rfl:     cetirizine (zyrTEC) 10 MG tablet,  Take 1 tablet by mouth Daily., Disp: , Rfl:     fluticasone (FLONASE) 50 MCG/ACT nasal spray, , Disp: , Rfl:     Micardis HCT 40-12.5 MG per tablet, Take 1 tablet by mouth Daily., Disp: , Rfl:     pantoprazole (PROTONIX) 40 MG EC tablet, Take 1 tablet by mouth Daily., Disp: , Rfl:     tadalafil (CIALIS) 5 MG tablet, Take 1 tablet by mouth Daily., Disp: 30 tablet, Rfl: 3    tamsulosin (FLOMAX) 0.4 MG capsule 24 hr capsule, TAKE 1 CAPSULE DAILY, Disp: 90 capsule, Rfl: 3    EPINEPHrine (ADRENALIN) 0.1 % nasal solution, Administer 0.3 mL into the nostril(s) as directed by provider Daily. (Patient not taking: Reported on 3/10/2025), Disp: , Rfl:     montelukast (Singulair) 10 MG tablet, Take 1 tablet by mouth Every Night. (Patient not taking: Reported on 3/10/2025), Disp: 90 tablet, Rfl: 0     Allergies     No Known Allergies    Social History       Social History     Social History Narrative    Not on file         Objective       BP 90/59 (BP Location: Left arm, Patient Position: Sitting, Cuff Size: Large Adult)   Pulse 93   Wt 133 kg (293 lb 9.6 oz)   SpO2 95%   BMI 43.36 kg/m²       Physical Exam  HENT:      Head: Normocephalic.   Cardiovascular:      Rate and Rhythm: Normal rate.   Pulmonary:      Effort: Pulmonary effort is normal.   Musculoskeletal:         General: Normal range of motion.   Neurological:      General: No focal deficit present.      Mental Status: He is alert.   Psychiatric:         Mood and Affect: Mood normal.               Results       Result Review :    The following data was reviewed by: FROYLAN Leonard on 03/10/2025:    Lab Results - Last 18 Months   Lab Units 06/20/24  0637   WBC 10*3/mm3 8.91   HEMOGLOBIN g/dL 16.4   MCV fL 94.3   PLATELETS 10*3/mm3 314         Lab Results - Last 18 Months   Lab Units 01/28/25  0813 06/20/24  0637   BUN mg/dL 11 8   CREATININE mg/dL 0.96 0.93   SODIUM mmol/L 136 141   POTASSIUM mmol/L 4.5 4.1   CHLORIDE mmol/L 99 103   CO2 mmol/L 27.3  25.2   GLUCOSE mg/dL 111* 104*      Lab Results - Last 18 Months   Lab Units 02/07/25  0704   PROTIME Seconds 14.1   INR  1.05     Lab Results - Last 18 Months   Lab Units 02/07/25  0704 01/28/25  0813 07/16/24  0643   AST (SGOT) U/L 62* 75* 45*   ALT (SGPT) U/L 92* 90* 68*   ALK PHOS U/L 56 66 59   BILIRUBIN mg/dL 0.9 0.8 0.6   BILIRUBIN DIRECT mg/dL 0.3  --  <0.2   TOTAL PROTEIN g/dL 7.4  7.0 7.2 6.9   ALBUMIN g/dL 4.1  3.8 4.0 4.2      Lab Results - Last 18 Months   Lab Units 02/07/25  0704   IRON mcg/dL 167*   TRANSFERRIN mg/dL 307   TIBC mcg/dL 457   FERRITIN ng/mL 299.00     Lab Results - Last 18 Months   Lab Units 02/07/25  0704   HEP A IGM  Non-Reactive       US Liver  Result Date: 3/4/2025  Impression: 1.Diffusely increased echogenicity of the liver parenchyma suggesting steatosis. 2.No evidence of cholelithiasis or acute cholecystitis. 3.Nonvisualization of the pancreas. Electronically Signed: Jagjit Rene MD  3/4/2025 11:55 AM EST  Workstation ID: [id number]      Results  Laboratory Studies  Iron level was high at 167. Ferritin was normal. ALT and AST were elevated.    Imaging  Ultrasound showed diffusely increased echogenicity of the liver parenchyma suggestive of steatosis, no focal hepatic masses identified, liver measures 19.5 cm in superior to inferior dimension, appropriate flow waveform and direction in the portal and hepatic veins, no ascites demonstrated, gallbladder has a normal appearance without stones, sludge or wall thickening, common bile duct measures 6, right kidney has a normal appearance and measures 11.8 cm in length.             Assessment and Plan              Diagnoses and all orders for this visit:    1. Hepatic steatosis (Primary)  -     Hemochromatosis Mutation; Future  -     Hepatic Function Panel; Future  -     CBC (No Diff); Future  -     Hepatic Function Panel; Future  -     Protime-INR; Future  -     US Liver; Future    2. Body mass index (BMI) of 40.1 to 44.9 in adult    3.  Iron excess  -     Hemochromatosis Mutation; Future    4. Elevated LFTs        Assessment & Plan  1. Hepatic steatosis.  The ultrasound results indicate a fatty liver with diffusely increased echogenicity of the liver parenchyma. No focal hepatic masses, gallstones, or gallbladder inflammation were identified. The liver measures 19.5 cm in superior to inferior dimension. He reports no right upper quadrant pain, yellowing of the skin or eyes, or issues with bleeding easily, although he bruises easily. He is advised to continue reducing sugar intake gradually. A re-evaluation of hepatic function will be conducted. An EGD is scheduled for 09/19/2025. Repeat labs, including INR, hepatic function, and CBC, will be done every 6 months, with the next set due in September 2025.    2. Elevated iron levels.  His iron levels were elevated at 167, but ferritin levels were normal, indicating no iron overload. The possibility of hemochromatosis was discussed, but the tests did not show this condition. He has not had any blood transfusions, iron infusions, or a history of thalassemia or sickle cell disease. He has not taken a multivitamin in a couple of months but had taken it for years prior. He used to drink alcohol daily but has not had a drink in over a month. He is advised to discuss further workup with his primary care physician, including the possibility of bloodletting. An order for additional tests has been placed to rule out any underlying conditions.    Follow-up  The patient will follow up after the EGD.        * Surgery not found *    Follow Up     Follow Up   No follow-ups on file.    Patient was scheduled for EGD for esophageal dysphagia on visit on 2/3/2025.    Patient was given instructions and counseling regarding his condition or for health maintenance advice. Please see specific information pulled into the AVS if appropriate.

## 2025-03-10 NOTE — PROGRESS NOTES
Chief Complaint     Follow-up (fatty liver, liver US f/u/)    Patient or patient representative verbalized consent for the use of Ambient Listening during the visit with  FROYLAN Leonard for chart documentation. 3/11/2025  14:55 EDT      History of Present Illness     Dennis Finnegan is a 65 y.o. male who presents to [facility] for follow-up of fatty liver.    History of Present Illness  The patient is a 65-year-old male who presents for evaluation of fatty liver and elevated iron levels.    He has been making efforts to reduce his sugar intake, which he finds challenging. He reports no pain in the right upper quadrant, jaundice, or easy bleeding. However, he does note a tendency to bruise easily, particularly after blood draws, a condition he has experienced throughout his life. He has lost approximately 6 pounds since his last visit, with his weight decreasing from 299 to 293 pounds. He reports no presence of blood in his stool, black tarry stools, abdominal pain, nausea, or vomiting. An EGD is scheduled for next Wednesday.    He recalls a previous discussion regarding elevated iron levels in his blood and was advised to consult his primary care physician. He expresses concern about potential water contamination at his residence. His diet is notably lacking in leafy vegetables and salads. He has never received a blood transfusion and has no history of thalassemia or sickle cell disease. He has not undergone any iron infusions. He has abstained from alcohol for over a month, following a period of daily consumption. He is unaware of any family history of hemochromatosis. He has not taken a multivitamin in several months due to running out but had been taking it consistently for many years prior.    SOCIAL HISTORY  The patient has not had a drink in over a month but previously drank alcohol daily.    FAMILY HISTORY  The patient does not report any family history of  hemochromatosis that he is aware of.    2/28/2025 Ultrasound of Liver: Diffusely increased echogenicity of the liver parenchyma suggesting steatosis. No evidence of gallstones or gallbladder inflammation.       2/20/2025 Hepatic lab work up: I have reviewed the patient's most recent liver work-up which is normal ruling out underlying hepatitis, autoimmune, and genetic cause for elevated liver enzymes. Elevated liver enzyme and fatty liver on ultrasound is likely due to diet. Advise patient to maintain a healthy lifestyle: weight loss of 10%, cutting back on carbs, sugars, and fried fatty foods, limiting intake of soda and sugary drinks, and abstain from alcohol. ALT elevated at 92, AST elevated at 62.  Iron level elevated at 167.     2/3/2025 Initial office visit: The patient is a 65-year-old male who presents for evaluation of elevated liver function tests (LFTs) and hepatic steatosis. He was referred by nurse practitioner, Lia Gillis, for further evaluation of his elevated liver function tests (LFTs) and hepatic steatosis. He reports experiencing nausea, coughing postprandially, and belching. He has a lifelong history of nausea. He also experiences fatigue and frequent coughing. Patient reports trouble swallowing and experiences food getting stuck in his esophagus. He describes a sensation of food impaction in the mid-chest region. This sensation is predominantly associated with solid foods and has been present since the onset of his symptoms. Occasionally, he experiences vomiting and regurgitation but has not attempted fluid intake to alleviate these symptoms. He reports no specific food triggers for these symptoms. No unintentional weight loss or hematemesis. Despite a long-term history of acid reflux, managed with pantoprazole for approximately 20 years, he has not experienced similar symptoms in the past. He has sought consultation from an allergist due to persistent nausea and dysphagia.  His bowel  movements are regular, occurring once or twice daily, with formed to mushy consistency. Denies hematochezia or melena. He reports no abdominal pain. He has not been informed about his fatty liver diagnosis. He reports no history of intravenous drug use, unprofessional tattoos, hepatitis exposure, or blood transfusions.     Continue Protonix 40 mg daily on empty stomach for GERD. I have recommended that the patient undergo further evaluation with an EGD due to esophageal dysphagia. Hepatic lab workup ordered to determine cause of fatty liver. Differential diagnosis include but are not limited to viral hepatitis, autoimmune hepatitis, NAFLD, toxic hepatitis, vascular causes such as Budd Chiari or CHF, or hereditary causes such as hemochromatosis, alpha 1 antitrypsin deficiency, or Vivek disease. Follow up Liver US and Labs in 6 months. Will order FibroScan and WILSON Fibrosure as noninvasive testing for fibrosis and fatty liver.      SOCIAL HISTORY  He quit smoking 27 years ago. He drinks alcohol occasionally.     FAMILY HISTORY  He reports no family history of liver disease, colon cancer, or colon polyps.       History      Past Medical History:   Diagnosis Date    Anxiety     BPH (benign prostatic hyperplasia)     Depression     Erectile dysfunction     GERD (gastroesophageal reflux disease)     Hypertension     Low back pain      Past Surgical History:   Procedure Laterality Date    COLONOSCOPY       Family History   Problem Relation Age of Onset    Colon cancer Neg Hx         Current Medications       Current Outpatient Medications:     ALPRAZolam (XANAX) 0.5 MG tablet, Take 1 tablet by mouth Daily As Needed for Anxiety., Disp: 18 tablet, Rfl: 0    atenolol (TENORMIN) 25 MG tablet, Take 1 tablet by mouth Daily., Disp: 90 tablet, Rfl: 0    azelastine (ASTELIN) 0.1 % nasal spray, Administer 2 sprays into the nostril(s) as directed by provider 2 (Two) Times a Day., Disp: , Rfl:     cetirizine (zyrTEC) 10 MG tablet,  Take 1 tablet by mouth Daily., Disp: , Rfl:     fluticasone (FLONASE) 50 MCG/ACT nasal spray, , Disp: , Rfl:     Micardis HCT 40-12.5 MG per tablet, Take 1 tablet by mouth Daily., Disp: , Rfl:     pantoprazole (PROTONIX) 40 MG EC tablet, Take 1 tablet by mouth Daily., Disp: , Rfl:     tadalafil (CIALIS) 5 MG tablet, Take 1 tablet by mouth Daily., Disp: 30 tablet, Rfl: 3    tamsulosin (FLOMAX) 0.4 MG capsule 24 hr capsule, TAKE 1 CAPSULE DAILY, Disp: 90 capsule, Rfl: 3    EPINEPHrine (ADRENALIN) 0.1 % nasal solution, Administer 0.3 mL into the nostril(s) as directed by provider Daily. (Patient not taking: Reported on 3/10/2025), Disp: , Rfl:     montelukast (Singulair) 10 MG tablet, Take 1 tablet by mouth Every Night. (Patient not taking: Reported on 3/10/2025), Disp: 90 tablet, Rfl: 0     Allergies     No Known Allergies    Social History       Social History     Social History Narrative    Not on file         Objective       BP 90/59 (BP Location: Left arm, Patient Position: Sitting, Cuff Size: Large Adult)   Pulse 93   Wt 133 kg (293 lb 9.6 oz)   SpO2 95%   BMI 43.36 kg/m²       Physical Exam  HENT:      Head: Normocephalic.   Cardiovascular:      Rate and Rhythm: Normal rate.   Pulmonary:      Effort: Pulmonary effort is normal.   Musculoskeletal:         General: Normal range of motion.   Neurological:      General: No focal deficit present.      Mental Status: He is alert.   Psychiatric:         Mood and Affect: Mood normal.               Results       Result Review :    The following data was reviewed by: FROYLAN Leonard on 03/10/2025:    Lab Results - Last 18 Months   Lab Units 06/20/24  0637   WBC 10*3/mm3 8.91   HEMOGLOBIN g/dL 16.4   MCV fL 94.3   PLATELETS 10*3/mm3 314         Lab Results - Last 18 Months   Lab Units 01/28/25  0813 06/20/24  0637   BUN mg/dL 11 8   CREATININE mg/dL 0.96 0.93   SODIUM mmol/L 136 141   POTASSIUM mmol/L 4.5 4.1   CHLORIDE mmol/L 99 103   CO2 mmol/L 27.3  25.2   GLUCOSE mg/dL 111* 104*      Lab Results - Last 18 Months   Lab Units 02/07/25  0704   PROTIME Seconds 14.1   INR  1.05     Lab Results - Last 18 Months   Lab Units 02/07/25  0704 01/28/25  0813 07/16/24  0643   AST (SGOT) U/L 62* 75* 45*   ALT (SGPT) U/L 92* 90* 68*   ALK PHOS U/L 56 66 59   BILIRUBIN mg/dL 0.9 0.8 0.6   BILIRUBIN DIRECT mg/dL 0.3  --  <0.2   TOTAL PROTEIN g/dL 7.4  7.0 7.2 6.9   ALBUMIN g/dL 4.1  3.8 4.0 4.2      Lab Results - Last 18 Months   Lab Units 02/07/25  0704   IRON mcg/dL 167*   TRANSFERRIN mg/dL 307   TIBC mcg/dL 457   FERRITIN ng/mL 299.00     Lab Results - Last 18 Months   Lab Units 02/07/25  0704   HEP A IGM  Non-Reactive       US Liver  Result Date: 3/4/2025  Impression: 1.Diffusely increased echogenicity of the liver parenchyma suggesting steatosis. 2.No evidence of cholelithiasis or acute cholecystitis. 3.Nonvisualization of the pancreas. Electronically Signed: Jagjit Rene MD  3/4/2025 11:55 AM EST  Workstation ID: [id number]      Results  Laboratory Studies  Iron level was high at 167. Ferritin was normal. ALT and AST were elevated.    Imaging  Ultrasound showed diffusely increased echogenicity of the liver parenchyma suggestive of steatosis, no focal hepatic masses identified, liver measures 19.5 cm in superior to inferior dimension, appropriate flow waveform and direction in the portal and hepatic veins, no ascites demonstrated, gallbladder has a normal appearance without stones, sludge or wall thickening, common bile duct measures 6, right kidney has a normal appearance and measures 11.8 cm in length.             Assessment and Plan              Diagnoses and all orders for this visit:    1. Hepatic steatosis (Primary)  -     Hemochromatosis Mutation; Future  -     Hepatic Function Panel; Future  -     CBC (No Diff); Future  -     Hepatic Function Panel; Future  -     Protime-INR; Future  -     US Liver; Future    2. Body mass index (BMI) of 40.1 to 44.9 in adult    3.  Iron excess  -     Hemochromatosis Mutation; Future    4. Elevated LFTs        Assessment & Plan  1. Hepatic steatosis.  The ultrasound results indicate a fatty liver with diffusely increased echogenicity of the liver parenchyma. No focal hepatic masses, gallstones, or gallbladder inflammation were identified. The liver measures 19.5 cm in superior to inferior dimension. He reports no right upper quadrant pain, yellowing of the skin or eyes, or issues with bleeding easily, although he bruises easily. He is advised to continue reducing sugar intake gradually. A re-evaluation of hepatic function will be conducted. An EGD is scheduled for 09/19/2025. Repeat labs, including INR, hepatic function, and CBC, will be done every 6 months, with the next set due in September 2025.    2. Elevated iron levels.  His iron levels were elevated at 167, but ferritin levels were normal, indicating no iron overload. The possibility of hemochromatosis was discussed, but the tests did not show this condition. He has not had any blood transfusions, iron infusions, or a history of thalassemia or sickle cell disease. He has not taken a multivitamin in a couple of months but had taken it for years prior. He used to drink alcohol daily but has not had a drink in over a month. He is advised to discuss further workup with his primary care physician, including the possibility of bloodletting. An order for additional tests has been placed to rule out any underlying conditions.    Follow-up  The patient will follow up after the EGD.        * Surgery not found *    Follow Up     Follow Up   No follow-ups on file.    Patient was scheduled for EGD for esophageal dysphagia on visit on 2/3/2025.    Patient was given instructions and counseling regarding his condition or for health maintenance advice. Please see specific information pulled into the AVS if appropriate.

## 2025-03-11 ENCOUNTER — LAB (OUTPATIENT)
Dept: LAB | Facility: HOSPITAL | Age: 65
End: 2025-03-11
Payer: MEDICARE

## 2025-03-11 DIAGNOSIS — E83.19 IRON EXCESS: ICD-10-CM

## 2025-03-11 DIAGNOSIS — K76.0 HEPATIC STEATOSIS: ICD-10-CM

## 2025-03-11 LAB
ALBUMIN SERPL-MCNC: 3.4 G/DL (ref 3.5–5.2)
ALP SERPL-CCNC: 64 U/L (ref 39–117)
ALT SERPL W P-5'-P-CCNC: 61 U/L (ref 1–41)
AST SERPL-CCNC: 47 U/L (ref 1–40)
BILIRUB CONJ SERPL-MCNC: 0.2 MG/DL (ref 0–0.3)
BILIRUB INDIRECT SERPL-MCNC: 0.4 MG/DL
BILIRUB SERPL-MCNC: 0.6 MG/DL (ref 0–1.2)
PROT SERPL-MCNC: 7.1 G/DL (ref 6–8.5)

## 2025-03-11 PROCEDURE — 36415 COLL VENOUS BLD VENIPUNCTURE: CPT

## 2025-03-11 PROCEDURE — 80076 HEPATIC FUNCTION PANEL: CPT

## 2025-03-13 NOTE — PRE-PROCEDURE INSTRUCTIONS
Attempted pat call no answer no voicemail.   Reminded of arrival time at 1000  , Entrance C of the main hospital. Instructed to bring or have a  over the age of 18 set up to drive you home the day of procedure.      Reminded them not to eat or drink anything am of procedure unless its a sip of water with medications.

## 2025-03-14 NOTE — PRE-PROCEDURE INSTRUCTIONS
"Instructed on date and arrival time of 1000. Instructed that arrival time is not procedure time but allows time to prepare for procedure. Come to entrance \"C\".  Must have  over age 18 to drive home.  May have two visitors; however, children under 12 must stay in waiting room.  Discussed diet/NPO.  May take medications as usual except for blood thinners, diabetic medications, or weight loss medications.  Verbalized understanding of instructions given.  Instructed to call for questions or concerns.  "

## 2025-03-17 LAB
HFE GENE MUT ANL BLD/T: NORMAL
IMP & REVIEW OF LAB RESULTS: NORMAL

## 2025-03-18 ENCOUNTER — ANESTHESIA EVENT (OUTPATIENT)
Dept: GASTROENTEROLOGY | Facility: HOSPITAL | Age: 65
End: 2025-03-18
Payer: MEDICARE

## 2025-03-18 NOTE — ANESTHESIA PREPROCEDURE EVALUATION
Anesthesia Evaluation     Patient summary reviewed and Nursing notes reviewed   NPO Solid Status: > 8 hours  NPO Liquid Status: > 4 hours           Airway   Mallampati: II  TM distance: <3 FB  Neck ROM: full  No difficulty expected and Large neck circumference  Dental    (+) partials    Comment: Upper partials removed prior to procedure , permanent bridge on lower     Pulmonary     breath sounds clear to auscultation  (+) a smoker Former,sleep apnea (noncompliant with cpap)  Cardiovascular - normal exam  Exercise tolerance: good (4-7 METS)    Patient on routine beta blocker  Rhythm: regular  Rate: normal    (+) hypertension well controlled      Neuro/Psych  (+) psychiatric history Anxiety and Depression  GI/Hepatic/Renal/Endo    (+) obesity, morbid obesity, GERD well controlled, liver disease    Musculoskeletal (-) negative ROS    Abdominal   (+) obese    Abdomen: soft.   Substance History - negative use     OB/GYN negative ob/gyn ROS         Other        ROS/Med Hx Other: Dysphagia    US Abd 08/28/24:   Impression:  No evidence of abdominal aortic aneurysm   Abdominal aorta measures between 2.5 and 2.9 cm. Recommend follow up imaging in 10 years per Society of Vascular Surgery Guidelines.  Electronically Signed: Fran Severino MD     No EKG on file                     Anesthesia Plan    ASA 3     general   total IV anesthesia  (Total IV Anesthesia    Patient understands anesthesia not responsible for dental damage.      Discussed risks with pt including aspiration, allergic reactions, apnea, advanced airway placement. Pt verbalized understanding. All questions answered.     )  intravenous induction     Anesthetic plan, risks, benefits, and alternatives have been provided, discussed and informed consent has been obtained with: patient.  Pre-procedure education provided  Plan discussed with CRNA.      CODE STATUS:

## 2025-03-19 ENCOUNTER — ANESTHESIA (OUTPATIENT)
Dept: GASTROENTEROLOGY | Facility: HOSPITAL | Age: 65
End: 2025-03-19
Payer: MEDICARE

## 2025-03-19 ENCOUNTER — HOSPITAL ENCOUNTER (OUTPATIENT)
Facility: HOSPITAL | Age: 65
Setting detail: HOSPITAL OUTPATIENT SURGERY
Discharge: HOME | End: 2025-03-19
Attending: INTERNAL MEDICINE | Admitting: INTERNAL MEDICINE
Payer: MEDICARE

## 2025-03-19 VITALS
HEART RATE: 96 BPM | DIASTOLIC BLOOD PRESSURE: 59 MMHG | TEMPERATURE: 96.9 F | OXYGEN SATURATION: 95 % | WEIGHT: 290.35 LBS | SYSTOLIC BLOOD PRESSURE: 136 MMHG | BODY MASS INDEX: 42.88 KG/M2 | RESPIRATION RATE: 17 BRPM

## 2025-03-19 DIAGNOSIS — R13.19 ESOPHAGEAL DYSPHAGIA: ICD-10-CM

## 2025-03-19 PROCEDURE — 43239 EGD BIOPSY SINGLE/MULTIPLE: CPT | Performed by: INTERNAL MEDICINE

## 2025-03-19 PROCEDURE — 88305 TISSUE EXAM BY PATHOLOGIST: CPT | Performed by: INTERNAL MEDICINE

## 2025-03-19 PROCEDURE — 25010000002 LIDOCAINE PF 2% 2 % SOLUTION

## 2025-03-19 PROCEDURE — 25810000003 LACTATED RINGERS PER 1000 ML

## 2025-03-19 PROCEDURE — 25010000002 PROPOFOL 10 MG/ML EMULSION

## 2025-03-19 RX ORDER — LIDOCAINE HYDROCHLORIDE 20 MG/ML
INJECTION, SOLUTION EPIDURAL; INFILTRATION; INTRACAUDAL; PERINEURAL AS NEEDED
Status: DISCONTINUED | OUTPATIENT
Start: 2025-03-19 | End: 2025-03-19 | Stop reason: SURG

## 2025-03-19 RX ORDER — PROPOFOL 10 MG/ML
VIAL (ML) INTRAVENOUS AS NEEDED
Status: DISCONTINUED | OUTPATIENT
Start: 2025-03-19 | End: 2025-03-19 | Stop reason: SURG

## 2025-03-19 RX ORDER — SODIUM CHLORIDE, SODIUM LACTATE, POTASSIUM CHLORIDE, CALCIUM CHLORIDE 600; 310; 30; 20 MG/100ML; MG/100ML; MG/100ML; MG/100ML
INJECTION, SOLUTION INTRAVENOUS CONTINUOUS PRN
Status: DISCONTINUED | OUTPATIENT
Start: 2025-03-19 | End: 2025-03-19 | Stop reason: SURG

## 2025-03-19 RX ORDER — SODIUM CHLORIDE, SODIUM LACTATE, POTASSIUM CHLORIDE, CALCIUM CHLORIDE 600; 310; 30; 20 MG/100ML; MG/100ML; MG/100ML; MG/100ML
30 INJECTION, SOLUTION INTRAVENOUS CONTINUOUS
Status: DISCONTINUED | OUTPATIENT
Start: 2025-03-19 | End: 2025-03-19 | Stop reason: HOSPADM

## 2025-03-19 RX ADMIN — PROPOFOL 100 MG: 10 INJECTION, EMULSION INTRAVENOUS at 12:54

## 2025-03-19 RX ADMIN — PROPOFOL 225 MCG/KG/MIN: 10 INJECTION, EMULSION INTRAVENOUS at 12:54

## 2025-03-19 RX ADMIN — SODIUM CHLORIDE, POTASSIUM CHLORIDE, SODIUM LACTATE AND CALCIUM CHLORIDE: 600; 310; 30; 20 INJECTION, SOLUTION INTRAVENOUS at 12:51

## 2025-03-19 RX ADMIN — LIDOCAINE HYDROCHLORIDE 100 MG: 20 INJECTION, SOLUTION INTRAVENOUS at 12:54

## 2025-03-19 RX ADMIN — SODIUM CHLORIDE, SODIUM LACTATE, POTASSIUM CHLORIDE, CALCIUM CHLORIDE 30 ML/HR: 20; 30; 600; 310 INJECTION, SOLUTION INTRAVENOUS at 11:41

## 2025-03-19 NOTE — ANESTHESIA POSTPROCEDURE EVALUATION
Patient: Dennis Finnegan    Procedure Summary       Date: 03/19/25 Room / Location: [facility] ENDOSCOPY 5 / [facility] ENDOSCOPY    Anesthesia Start: 1251 Anesthesia Stop: 1309    Procedure: ESOPHAGOGASTRODUODENOSCOPY WITH BIOPSIES Diagnosis:       Esophageal dysphagia      (Esophageal dysphagia [R13.19])    Surgeons: Everton Coles MD Provider: Rasta Schultz CRNA    Anesthesia Type: general ASA Status: 3            Anesthesia Type: general    Vitals  Vitals Value Taken Time   /59 03/19/25 13:30   Temp 36.1 °C (96.9 °F) 03/19/25 13:15   Pulse 99 03/19/25 13:31   Resp 17 03/19/25 13:30   SpO2 94 % 03/19/25 13:31   Vitals shown include unfiled device data.        Post Anesthesia Care and Evaluation    Patient location during evaluation: bedside  Patient participation: complete - patient participated  Level of consciousness: awake  Pain management: adequate    Airway patency: patent  Anesthetic complications: No anesthetic complications  PONV Status: controlled  Cardiovascular status: acceptable and stable  Respiratory status: acceptable

## 2025-03-20 LAB
CYTO UR: NORMAL
LAB AP CASE REPORT: NORMAL
LAB AP CLINICAL INFORMATION: NORMAL
PATH REPORT.FINAL DX SPEC: NORMAL
PATH REPORT.GROSS SPEC: NORMAL

## 2025-04-09 DIAGNOSIS — I10 ESSENTIAL HYPERTENSION: ICD-10-CM

## 2025-04-09 RX ORDER — ATENOLOL 25 MG/1
25 TABLET ORAL DAILY
Qty: 90 TABLET | Refills: 1 | Status: SHIPPED | OUTPATIENT
Start: 2025-04-09

## 2025-04-28 ENCOUNTER — LAB (OUTPATIENT)
Dept: LAB | Facility: HOSPITAL | Age: 65
End: 2025-04-28
Payer: MEDICARE

## 2025-04-28 ENCOUNTER — OFFICE VISIT (OUTPATIENT)
Dept: GASTROENTEROLOGY | Facility: CLINIC | Age: 65
End: 2025-04-28
Payer: MEDICARE

## 2025-04-28 VITALS
BODY MASS INDEX: 45.11 KG/M2 | WEIGHT: 304.6 LBS | SYSTOLIC BLOOD PRESSURE: 119 MMHG | OXYGEN SATURATION: 96 % | HEIGHT: 69 IN | HEART RATE: 92 BPM | DIASTOLIC BLOOD PRESSURE: 62 MMHG

## 2025-04-28 DIAGNOSIS — R53.83 OTHER FATIGUE: Primary | ICD-10-CM

## 2025-04-28 DIAGNOSIS — T78.1XXA FOOD SENSITIVITY WITH GASTROINTESTINAL SYMPTOMS: ICD-10-CM

## 2025-04-28 DIAGNOSIS — R05.3 CHRONIC COUGH: ICD-10-CM

## 2025-04-28 DIAGNOSIS — J30.5 ALLERGIC RHINITIS DUE TO FOOD: ICD-10-CM

## 2025-04-28 LAB
DEPRECATED RDW RBC AUTO: 42.6 FL (ref 37–54)
ERYTHROCYTE [DISTWIDTH] IN BLOOD BY AUTOMATED COUNT: 12.7 % (ref 12.3–15.4)
HCT VFR BLD AUTO: 47.2 % (ref 37.5–51)
HGB BLD-MCNC: 16.3 G/DL (ref 13–17.7)
MCH RBC QN AUTO: 32 PG (ref 26.6–33)
MCHC RBC AUTO-ENTMCNC: 34.5 G/DL (ref 31.5–35.7)
MCV RBC AUTO: 92.5 FL (ref 79–97)
PLATELET # BLD AUTO: 336 10*3/MM3 (ref 140–450)
PMV BLD AUTO: 11.1 FL (ref 6–12)
RBC # BLD AUTO: 5.1 10*6/MM3 (ref 4.14–5.8)
TSH SERPL DL<=0.05 MIU/L-ACNC: 1.8 UIU/ML (ref 0.27–4.2)
WBC NRBC COR # BLD AUTO: 7.39 10*3/MM3 (ref 3.4–10.8)

## 2025-04-28 PROCEDURE — 86003 ALLG SPEC IGE CRUDE XTRC EA: CPT

## 2025-04-28 PROCEDURE — 1159F MED LIST DOCD IN RCRD: CPT

## 2025-04-28 PROCEDURE — 85027 COMPLETE CBC AUTOMATED: CPT

## 2025-04-28 PROCEDURE — 99214 OFFICE O/P EST MOD 30 MIN: CPT

## 2025-04-28 PROCEDURE — 84443 ASSAY THYROID STIM HORMONE: CPT

## 2025-04-28 PROCEDURE — 1160F RVW MEDS BY RX/DR IN RCRD: CPT

## 2025-04-28 PROCEDURE — 3074F SYST BP LT 130 MM HG: CPT

## 2025-04-28 PROCEDURE — 3078F DIAST BP <80 MM HG: CPT

## 2025-04-28 PROCEDURE — 36415 COLL VENOUS BLD VENIPUNCTURE: CPT

## 2025-04-28 NOTE — PROGRESS NOTES
Chief Complaint     Follow-up (EGD f/u /Fatty Liver /Individuals with one copy of the H63D heterozygous are considered carriers. We can discuss in greater detail at next appointment. )    Patient or patient representative verbalized consent for the use of Ambient Listening during the visit with  FROYLAN Leonard for chart documentation. 4/28/2025  10:18 EDT      History of Present Illness     Dennis Finnegan is a 65 y.o. male who presents to [facility] for follow-up after EGD.    History of Present Illness  The patient is a 65-year-old male who presents for evaluation of chronic cough, fatigue, and elevated iron levels.    Persistent reflux symptoms, including postprandial nausea and fatigue, are reported despite the absence of heartburn. Pantoprazole 40 mg daily has been taken for approximately one year, effectively controlling acid production but not the reflux. No significant changes in his condition have been observed over the past year.    A chronic cough, attributed to allergies, has been present despite receiving allergy injections without significant improvement. The cough is particularly bothersome at night, disrupting sleep. There is also a sensation of a sore throat and chest cold. He has not previously consulted an otolaryngologist and is open to doing so. Concern about potential food allergies is expressed, as previous allergy testing did not include food allergens.    A lack of energy and motivation is reported, attributed to age. No episodes of hematemesis or melena have been experienced. Uncertainty about thyroid status is noted, as recent TSH testing has not been done. There is no known history of anemia.    Elevated iron levels are noted.    3/10/2025 Last office visit: The patient is a 65-year-old male who presents for evaluation of fatty liver and elevated iron levels.     He has been making efforts to reduce his sugar intake, which he finds challenging.  He reports no pain in the right upper quadrant, jaundice, or easy bleeding. However, he does note a tendency to bruise easily, particularly after blood draws, a condition he has experienced throughout his life. He has lost approximately 6 pounds since his last visit, with his weight decreasing from 299 to 293 pounds. He reports no presence of blood in his stool, black tarry stools, abdominal pain, nausea, or vomiting. An EGD is scheduled for next Wednesday.     He recalls a previous discussion regarding elevated iron levels in his blood and was advised to consult his primary care physician. He expresses concern about potential water contamination at his residence. His diet is notably lacking in leafy vegetables and salads. He has never received a blood transfusion and has no history of thalassemia or sickle cell disease. He has not undergone any iron infusions. He has abstained from alcohol for over a month, following a period of daily consumption. He is unaware of any family history of hemochromatosis. He has not taken a multivitamin in several months due to running out but had been taking it consistently for many years prior.     SOCIAL HISTORY  The patient has not had a drink in over a month but previously drank alcohol daily.     FAMILY HISTORY  The patient does not report any family history of hemochromatosis that he is aware of.     2/28/2025 Ultrasound of Liver: Diffusely increased echogenicity of the liver parenchyma suggesting steatosis. No evidence of gallstones or gallbladder inflammation.        2/20/2025 Hepatic lab work up: I have reviewed the patient's most recent liver work-up which is normal ruling out underlying hepatitis, autoimmune, and genetic cause for elevated liver enzymes. Elevated liver enzyme and fatty liver on ultrasound is likely due to diet. Advise patient to maintain a healthy lifestyle: weight loss of 10%, cutting back on carbs, sugars, and fried fatty foods, limiting intake of soda  and sugary drinks, and abstain from alcohol. ALT elevated at 92, AST elevated at 62.  Iron level elevated at 167.      2/3/2025 Initial office visit: The patient is a 65-year-old male who presents for evaluation of elevated liver function tests (LFTs) and hepatic steatosis. He was referred by nurse practitioner, Lia Gillis, for further evaluation of his elevated liver function tests (LFTs) and hepatic steatosis. He reports experiencing nausea, coughing postprandially, and belching. He has a lifelong history of nausea. He also experiences fatigue and frequent coughing. Patient reports trouble swallowing and experiences food getting stuck in his esophagus. He describes a sensation of food impaction in the mid-chest region. This sensation is predominantly associated with solid foods and has been present since the onset of his symptoms. Occasionally, he experiences vomiting and regurgitation but has not attempted fluid intake to alleviate these symptoms. He reports no specific food triggers for these symptoms. No unintentional weight loss or hematemesis. Despite a long-term history of acid reflux, managed with pantoprazole for approximately 20 years, he has not experienced similar symptoms in the past. He has sought consultation from an allergist due to persistent nausea and dysphagia.  His bowel movements are regular, occurring once or twice daily, with formed to mushy consistency. Denies hematochezia or melena. He reports no abdominal pain. He has not been informed about his fatty liver diagnosis. He reports no history of intravenous drug use, unprofessional tattoos, hepatitis exposure, or blood transfusions.      Continue Protonix 40 mg daily on empty stomach for GERD. I have recommended that the patient undergo further evaluation with an EGD due to esophageal dysphagia. Hepatic lab workup ordered to determine cause of fatty liver. Differential diagnosis include but are not limited to viral hepatitis, autoimmune  hepatitis, NAFLD, toxic hepatitis, vascular causes such as Budd Chiari or CHF, or hereditary causes such as hemochromatosis, alpha 1 antitrypsin deficiency, or Vivek disease. Follow up Liver US and Labs in 6 months. Will order FibroScan and WILSON Fibrosure as noninvasive testing for fibrosis and fatty liver.      SOCIAL HISTORY  He quit smoking 27 years ago. He drinks alcohol occasionally.     FAMILY HISTORY  He reports no family history of liver disease, colon cancer, or colon polyps.       History      Past Medical History:   Diagnosis Date    Anxiety     BPH (benign prostatic hyperplasia)     Depression     Erectile dysfunction     GERD (gastroesophageal reflux disease)     Hypertension     Low back pain     Sleep apnea      Past Surgical History:   Procedure Laterality Date    BACK SURGERY  2020    COLONOSCOPY      ENDOSCOPY N/A 3/19/2025    Procedure: ESOPHAGOGASTRODUODENOSCOPY WITH BIOPSIES;  Surgeon: Everton Coles MD;  Location: [facility] ENDOSCOPY;  Service: Gastroenterology;  Laterality: N/A;  GASTRITIS     Family History   Problem Relation Age of Onset    Colon cancer Neg Hx         Current Medications       Current Outpatient Medications:     ALPRAZolam (XANAX) 0.5 MG tablet, Take 1 tablet by mouth Daily As Needed for Anxiety., Disp: 18 tablet, Rfl: 0    atenolol (TENORMIN) 25 MG tablet, TAKE 1 TABLET DAILY, Disp: 90 tablet, Rfl: 1    azelastine (ASTELIN) 0.1 % nasal spray, Administer 2 sprays into the nostril(s) as directed by provider 2 (Two) Times a Day., Disp: , Rfl:     cetirizine (zyrTEC) 10 MG tablet, Take 1 tablet by mouth Daily., Disp: , Rfl:     Micardis HCT 40-12.5 MG per tablet, Take 1 tablet by mouth Daily., Disp: , Rfl:     montelukast (Singulair) 10 MG tablet, Take 1 tablet by mouth Every Night., Disp: 90 tablet, Rfl: 0    pantoprazole (PROTONIX) 40 MG EC tablet, Take 1 tablet by mouth Daily., Disp: , Rfl:     tadalafil (CIALIS) 5 MG tablet, Take 1 tablet by mouth Daily., Disp: 30 tablet,  "Rfl: 3    tamsulosin (FLOMAX) 0.4 MG capsule 24 hr capsule, TAKE 1 CAPSULE DAILY, Disp: 90 capsule, Rfl: 3    EPINEPHrine (ADRENALIN) 0.1 % nasal solution, Administer 0.3 mL into the nostril(s) as directed by provider Daily. (Patient not taking: Reported on 3/10/2025), Disp: , Rfl:      Allergies     No Known Allergies    Social History       Social History     Social History Narrative    Not on file         Objective       /62 (BP Location: Right arm, Patient Position: Sitting, Cuff Size: Large Adult)   Pulse 92   Ht 175.3 cm (69\")   Wt (!) 138 kg (304 lb 9.6 oz)   SpO2 96%   BMI 44.98 kg/m²       Physical Exam  HENT:      Head: Normocephalic.   Cardiovascular:      Rate and Rhythm: Normal rate.   Pulmonary:      Effort: Pulmonary effort is normal.   Musculoskeletal:         General: Normal range of motion.   Neurological:      General: No focal deficit present.      Mental Status: He is alert.   Psychiatric:         Mood and Affect: Mood normal.               Results       Result Review :    The following data was reviewed by: FROYLAN Leonard on 04/28/2025:    Lab Results - Last 18 Months   Lab Units 06/20/24  0637   WBC 10*3/mm3 8.91   HEMOGLOBIN g/dL 16.4   MCV fL 94.3   PLATELETS 10*3/mm3 314         Lab Results - Last 18 Months   Lab Units 01/28/25  0813 06/20/24  0637   BUN mg/dL 11 8   CREATININE mg/dL 0.96 0.93   SODIUM mmol/L 136 141   POTASSIUM mmol/L 4.5 4.1   CHLORIDE mmol/L 99 103   CO2 mmol/L 27.3 25.2   GLUCOSE mg/dL 111* 104*      Lab Results - Last 18 Months   Lab Units 02/07/25  0704   PROTIME Seconds 14.1   INR  1.05     Lab Results - Last 18 Months   Lab Units 03/11/25  0702 02/07/25  0704 01/28/25  0813 07/16/24  0643   AST (SGOT) U/L 47* 62* 75* 45*   ALT (SGPT) U/L 61* 92* 90* 68*   ALK PHOS U/L 64 56 66 59   BILIRUBIN mg/dL 0.6 0.9 0.8 0.6   BILIRUBIN DIRECT mg/dL 0.2 0.3  --  <0.2   TOTAL PROTEIN g/dL 7.1 7.4  7.0 7.2 6.9   ALBUMIN g/dL 3.4* 4.1  3.8 4.0 4.2    "   Lab Results - Last 18 Months   Lab Units 02/07/25  0704   IRON mcg/dL 167*   TRANSFERRIN mg/dL 307   TIBC mcg/dL 457   FERRITIN ng/mL 299.00     Lab Results - Last 18 Months   Lab Units 02/07/25  0704   HEP A IGM  Non-Reactive       US Liver  Result Date: 3/4/2025  Impression: 1.Diffusely increased echogenicity of the liver parenchyma suggesting steatosis. 2.No evidence of cholelithiasis or acute cholecystitis. 3.Nonvisualization of the pancreas. Electronically Signed: Jagjit Rene MD  3/4/2025 11:55 AM EST  Workstation ID: [id number]      Results  Labs   - Stomach biopsy: 03/19/2025, Mild reactive gastropathy, negative for H. pylori, intestinal metaplasia, dysplasia or malignancy   - Distal esophagus biopsy: 03/19/2025, No pathologic change, negative for intestinal metaplasia, dysplasia, and malignancy   - Iron levels: Elevated   - Hemochromatosis mutation lab: H63D heterozygous mutation detected    Diagnostic Testing   - Upper GI endoscopy: 03/19/2025, Regular Z-line, esophageal mucosa changes including edema and altered vascularity suggestive of inflammation, and gastritis. First portion of small intestines (duodenum) was normal             Assessment and Plan              Diagnoses and all orders for this visit:    1. Other fatigue (Primary)  -     CBC (No Diff)  -     TSH    2. Chronic cough  -     Cancel: Ambulatory Referral to ENT (Otolaryngology)  -     Ambulatory Referral to ENT (Otolaryngology)    3. Food sensitivity with gastrointestinal symptoms  -     Allergen Food Panel; Future    4. Allergic rhinitis due to food  -     Allergen Food Panel; Future        Assessment & Plan  1. Chronic Cough:  - Referral to an otolaryngologist for further evaluation.  - Order a food allergen panel to rule out potential food allergies contributing to the cough.    2. Fatigue:  - Order a complete blood count (CBC) to assess for anemia.  - Order a thyroid-stimulating hormone (TSH) test to evaluate thyroid function.    3.  Elevated Iron Levels:  - H63D heterozygous mutation unlikely to exacerbate fatty liver disease.  - Inform children about the genetic predisposition.  - Follow a low-fat, low-sugar, and low-carbohydrate diet to potentially reverse hepatic steatosis.    4. Gastritis:  - Follow a GERD prevention diet.  - Continue current medications.  - Avoid aspirin, ibuprofen, and naproxen.  - Continue taking Protonix 40 mg daily.    Follow-up: Follow up in 6 months.        * Surgery not found *    Follow Up     Follow Up   Return in about 6 months (around 10/28/2025).  Patient was given instructions and counseling regarding his condition or for health maintenance advice. Please see specific information pulled into the AVS if appropriate.

## 2025-05-04 LAB
BARLEY IGE QN: <0.1 KU/L
BEEF IGE QN: <0.1 KU/L
CABBAGE IGE QN: <0.1 KU/L
CARROT IGE QN: <0.1 KU/L
CHICKEN MEAT IGE QN: <0.1 KU/L
CODFISH IGE QN: <0.1 KU/L
CONV CLASS DESCRIPTION: NORMAL
CORN IGE QN: <0.1 KU/L
COW MILK IGE QN: <0.1 KU/L
CRAB IGE QN: <0.1 KU/L
EGG WHITE IGE QN: <0.1 KU/L
GRAPE IGE QN: <0.1 KU/L
GREEN PEPPER IGE QN: <0.1 KU/L
LETTUCE IGE QN: <0.1 KU/L
OAT IGE QN: <0.1 KU/L
ORANGE IGE QN: <0.1 KU/L
PEANUT IGE QN: <0.1 KU/L
PORK IGE QN: <0.1 KU/L
POTATO IGE QN: <0.1 KU/L
RICE IGE QN: <0.1 KU/L
RYE IGE QN: <0.1 KU/L
SHRIMP IGE QN: <0.1 KU/L
SOYBEAN IGE QN: <0.1 KU/L
TOMATO IGE QN: <0.1 KU/L
TUNA IGE QN: <0.1 KU/L
WHEAT IGE QN: <0.1 KU/L
WHITE BEAN IGE QN: <0.1 KU/L

## 2025-05-13 ENCOUNTER — OFFICE VISIT (OUTPATIENT)
Dept: FAMILY MEDICINE CLINIC | Facility: CLINIC | Age: 65
End: 2025-05-13
Payer: MEDICARE

## 2025-05-13 VITALS
OXYGEN SATURATION: 96 % | WEIGHT: 295.2 LBS | HEART RATE: 82 BPM | BODY MASS INDEX: 43.72 KG/M2 | SYSTOLIC BLOOD PRESSURE: 124 MMHG | DIASTOLIC BLOOD PRESSURE: 79 MMHG | HEIGHT: 69 IN

## 2025-05-13 DIAGNOSIS — R73.03 PREDIABETES: ICD-10-CM

## 2025-05-13 DIAGNOSIS — K76.9 LIVER DISEASE, UNSPECIFIED: ICD-10-CM

## 2025-05-13 DIAGNOSIS — R53.83 FATIGUE, UNSPECIFIED TYPE: Primary | ICD-10-CM

## 2025-05-13 DIAGNOSIS — R79.0 ABNORMAL SERUM IRON LEVEL: ICD-10-CM

## 2025-05-13 DIAGNOSIS — K76.0 HEPATIC STEATOSIS: ICD-10-CM

## 2025-05-13 NOTE — PROGRESS NOTES
Chief Complaint  Fatigue    SOPHIE Finnegan presents to [facility]    History of Present Illness  Patient is 65-year-old male who presents today for extreme fatigue x 1 month. He denies nay recent illness. He is seeing GI for hepatic steatosis. Labs in February showed elevated A1c at 5.9 indicating prediabetes. He does have anxiety and depression but feels stable on meds currently. States he feels more down because he has no energy. He had labs oates by GI that did shows slightly elevated total iron but ferritin, RBC Hgb, Hct all normal. He does report a hx of a sleep study. Unable to tell me if he was dx with sleep apnea or not but states he was given a CPAP. He did not use very long as he felt it kept him awake. States he tosses and turns at night. Wakes up feeling tired. Denies any joint pain, body aches, chills. No CP, SOA.     Past Medical History:   Diagnosis Date    Anxiety     BPH (benign prostatic hyperplasia)     Depression     Erectile dysfunction     GERD (gastroesophageal reflux disease)     Hypertension     Low back pain     Sleep apnea       Family History   Problem Relation Age of Onset    Colon cancer Neg Hx       Past Surgical History:   Procedure Laterality Date    BACK SURGERY  2020    COLONOSCOPY      ENDOSCOPY N/A 3/19/2025    Procedure: ESOPHAGOGASTRODUODENOSCOPY WITH BIOPSIES;  Surgeon: Everton Coles MD;  Location: [facility] ENDOSCOPY;  Service: Gastroenterology;  Laterality: N/A;  GASTRITIS        Current Outpatient Medications:     ALPRAZolam (XANAX) 0.5 MG tablet, Take 1 tablet by mouth Daily As Needed for Anxiety., Disp: 18 tablet, Rfl: 0    atenolol (TENORMIN) 25 MG tablet, TAKE 1 TABLET DAILY, Disp: 90 tablet, Rfl: 1    azelastine (ASTELIN) 0.1 % nasal spray, Administer 2 sprays into the nostril(s) as directed by provider 2 (Two) Times a Day., Disp: , Rfl:     cetirizine (zyrTEC) 10 MG tablet, Take 1 tablet by mouth Daily., Disp: , Rfl:      "EPINEPHrine (ADRENALIN) 0.1 % nasal solution, Administer 0.3 mL into the nostril(s) as directed by provider Daily., Disp: , Rfl:     Micardis HCT 40-12.5 MG per tablet, Take 1 tablet by mouth Daily., Disp: , Rfl:     pantoprazole (PROTONIX) 40 MG EC tablet, Take 1 tablet by mouth Daily., Disp: , Rfl:     tadalafil (CIALIS) 5 MG tablet, Take 1 tablet by mouth Daily., Disp: 30 tablet, Rfl: 3    tamsulosin (FLOMAX) 0.4 MG capsule 24 hr capsule, TAKE 1 CAPSULE DAILY, Disp: 90 capsule, Rfl: 3    OBJECTIVE  Vital Signs:   /79 (BP Location: Left arm, Patient Position: Sitting, Cuff Size: Large Adult)   Pulse 82   Ht 175.6 cm (69.15\")   Wt 134 kg (295 lb 3.2 oz)   SpO2 96%   BMI 43.41 kg/m²    Estimated body mass index is 43.41 kg/m² as calculated from the following:    Height as of this encounter: 175.6 cm (69.15\").    Weight as of this encounter: 134 kg (295 lb 3.2 oz).     Wt Readings from Last 3 Encounters:   05/13/25 134 kg (295 lb 3.2 oz)   04/28/25 (!) 138 kg (304 lb 9.6 oz)   03/19/25 132 kg (290 lb 5.5 oz)     BP Readings from Last 3 Encounters:   05/13/25 124/79   04/28/25 119/62   03/19/25 136/59       Physical Exam  Vitals reviewed.   Constitutional:       General: He is not in acute distress.     Appearance: He is not ill-appearing.   HENT:      Head: Normocephalic and atraumatic.   Eyes:      Conjunctiva/sclera: Conjunctivae normal.   Cardiovascular:      Rate and Rhythm: Normal rate and regular rhythm.      Heart sounds: Normal heart sounds.   Pulmonary:      Effort: Pulmonary effort is normal.      Breath sounds: Normal breath sounds.   Musculoskeletal:      Cervical back: Normal range of motion.   Neurological:      Mental Status: He is alert and oriented to person, place, and time.   Psychiatric:         Mood and Affect: Affect is flat.         Behavior: Behavior normal.         Thought Content: Thought content normal.         Judgment: Judgment normal.          Result Review    Common labs  "         2/7/2025    07:04 3/11/2025    07:02 4/28/2025    11:06   Common Labs   Albumin 4.1     3.8  3.4     Total Bilirubin 0.9  0.6     Alkaline Phosphatase 56  64     AST (SGOT) 62  47     ALT (SGPT) 92  61     WBC   7.39    Hemoglobin   16.3    Hematocrit   47.2    Platelets   336    Triglycerides 133          US Liver  Result Date: 3/4/2025  Impression: 1.Diffusely increased echogenicity of the liver parenchyma suggesting steatosis. 2.No evidence of cholelithiasis or acute cholecystitis. 3.Nonvisualization of the pancreas. Electronically Signed: Jagjit Rene MD  3/4/2025 11:55 AM EST  Workstation ID: [id number]        The above data has been reviewed by FROYLAN Thomas 05/13/2025 08:45 EDT.          Patient Care Team:  Lia Gillis APRN as PCP - General (Nurse Practitioner)            ASSESSMENT & PLAN    Diagnoses and all orders for this visit:    1. Fatigue, unspecified type (Primary)  -     CBC & Differential; Future  -     Comprehensive Metabolic Panel; Future  -     TSH; Future  -     T4, Free; Future  -     EBV Antibody Profile; Future  -     CMV IgG IgM; Future  -     Vitamin B12; Future  -     Folate; Future  -     Testosterone; Future  -     Vitamin D 25 hydroxy; Future  -     Iron Profile; Future  -     Ferritin; Future    2. Abnormal serum iron level  -     CBC & Differential; Future  -     Iron Profile; Future  -     Ferritin; Future    3. Hepatic steatosis  -     Vitamin D 25 hydroxy; Future    4. Liver disease, unspecified  -     Vitamin D 25 hydroxy; Future    5. Prediabetes  -     Hemoglobin A1c; Future       Will order labs to review for underlying cause and treat if needed. Discussed potential benefit of a sleep study again, not very interested at this time. If labs come back unremarkable the next step is referral to sleep med for sleep study. Discussed with patient this could be an underlying deficiency, sleep apnea, depression, or fatigue from chronic illnesses.     Tobacco Use: Medium  Risk (5/13/2025)    Patient History     Smoking Tobacco Use: Former     Smokeless Tobacco Use: Never     Passive Exposure: Past       Follow Up     Return if symptoms worsen or fail to improve.      Patient was given instructions and counseling regarding his condition or for health maintenance advice. Please see specific information pulled into the AVS if appropriate.   I have reviewed information obtained and documented by others and I have confirmed the accuracy of this documented note.    FROYLAN Thomas

## 2025-05-14 ENCOUNTER — LAB (OUTPATIENT)
Dept: LAB | Facility: HOSPITAL | Age: 65
End: 2025-05-14
Payer: MEDICARE

## 2025-05-14 DIAGNOSIS — K76.9 LIVER DISEASE, UNSPECIFIED: ICD-10-CM

## 2025-05-14 DIAGNOSIS — R79.0 ABNORMAL SERUM IRON LEVEL: ICD-10-CM

## 2025-05-14 DIAGNOSIS — K76.0 HEPATIC STEATOSIS: ICD-10-CM

## 2025-05-14 DIAGNOSIS — R73.03 PREDIABETES: ICD-10-CM

## 2025-05-14 DIAGNOSIS — R53.83 FATIGUE, UNSPECIFIED TYPE: ICD-10-CM

## 2025-05-14 LAB
25(OH)D3 SERPL-MCNC: 14.4 NG/ML (ref 30–100)
ALBUMIN SERPL-MCNC: 4 G/DL (ref 3.5–5.2)
ALBUMIN/GLOB SERPL: 1.4 G/DL
ALP SERPL-CCNC: 64 U/L (ref 39–117)
ALT SERPL W P-5'-P-CCNC: 64 U/L (ref 1–41)
ANION GAP SERPL CALCULATED.3IONS-SCNC: 10.3 MMOL/L (ref 5–15)
AST SERPL-CCNC: 50 U/L (ref 1–40)
BASOPHILS # BLD AUTO: 0.07 10*3/MM3 (ref 0–0.2)
BASOPHILS NFR BLD AUTO: 1 % (ref 0–1.5)
BILIRUB SERPL-MCNC: 1 MG/DL (ref 0–1.2)
BUN SERPL-MCNC: 9 MG/DL (ref 8–23)
BUN/CREAT SERPL: 11 (ref 7–25)
CALCIUM SPEC-SCNC: 9.6 MG/DL (ref 8.6–10.5)
CHLORIDE SERPL-SCNC: 99 MMOL/L (ref 98–107)
CO2 SERPL-SCNC: 25.7 MMOL/L (ref 22–29)
CREAT SERPL-MCNC: 0.82 MG/DL (ref 0.76–1.27)
DEPRECATED RDW RBC AUTO: 44.5 FL (ref 37–54)
EGFRCR SERPLBLD CKD-EPI 2021: 96.9 ML/MIN/1.73
EOSINOPHIL # BLD AUTO: 0.39 10*3/MM3 (ref 0–0.4)
EOSINOPHIL NFR BLD AUTO: 5.3 % (ref 0.3–6.2)
ERYTHROCYTE [DISTWIDTH] IN BLOOD BY AUTOMATED COUNT: 12.9 % (ref 12.3–15.4)
FERRITIN SERPL-MCNC: 174 NG/ML (ref 30–400)
FOLATE SERPL-MCNC: 15.7 NG/ML (ref 4.78–24.2)
GLOBULIN UR ELPH-MCNC: 2.9 GM/DL
GLUCOSE SERPL-MCNC: 111 MG/DL (ref 65–99)
HBA1C MFR BLD: 6.1 % (ref 4.8–5.6)
HCT VFR BLD AUTO: 49 % (ref 37.5–51)
HGB BLD-MCNC: 16.9 G/DL (ref 13–17.7)
IMM GRANULOCYTES # BLD AUTO: 0.02 10*3/MM3 (ref 0–0.05)
IMM GRANULOCYTES NFR BLD AUTO: 0.3 % (ref 0–0.5)
IRON 24H UR-MRATE: 212 MCG/DL (ref 59–158)
IRON SATN MFR SERPL: 47 % (ref 20–50)
LYMPHOCYTES # BLD AUTO: 2.49 10*3/MM3 (ref 0.7–3.1)
LYMPHOCYTES NFR BLD AUTO: 33.9 % (ref 19.6–45.3)
MCH RBC QN AUTO: 32.8 PG (ref 26.6–33)
MCHC RBC AUTO-ENTMCNC: 34.5 G/DL (ref 31.5–35.7)
MCV RBC AUTO: 95 FL (ref 79–97)
MONOCYTES # BLD AUTO: 0.68 10*3/MM3 (ref 0.1–0.9)
MONOCYTES NFR BLD AUTO: 9.3 % (ref 5–12)
NEUTROPHILS NFR BLD AUTO: 3.69 10*3/MM3 (ref 1.7–7)
NEUTROPHILS NFR BLD AUTO: 50.2 % (ref 42.7–76)
NRBC BLD AUTO-RTO: 0 /100 WBC (ref 0–0.2)
PLATELET # BLD AUTO: 294 10*3/MM3 (ref 140–450)
PMV BLD AUTO: 10.3 FL (ref 6–12)
POTASSIUM SERPL-SCNC: 4.3 MMOL/L (ref 3.5–5.2)
PROT SERPL-MCNC: 6.9 G/DL (ref 6–8.5)
RBC # BLD AUTO: 5.16 10*6/MM3 (ref 4.14–5.8)
SODIUM SERPL-SCNC: 135 MMOL/L (ref 136–145)
T4 FREE SERPL-MCNC: 1.25 NG/DL (ref 0.92–1.68)
TESTOST SERPL-MCNC: 464 NG/DL (ref 193–740)
TIBC SERPL-MCNC: 453 MCG/DL (ref 298–536)
TRANSFERRIN SERPL-MCNC: 304 MG/DL (ref 200–360)
TSH SERPL DL<=0.05 MIU/L-ACNC: 1.94 UIU/ML (ref 0.27–4.2)
VIT B12 BLD-MCNC: 467 PG/ML (ref 211–946)
WBC NRBC COR # BLD AUTO: 7.34 10*3/MM3 (ref 3.4–10.8)

## 2025-05-14 PROCEDURE — 80053 COMPREHEN METABOLIC PANEL: CPT

## 2025-05-14 PROCEDURE — 86664 EPSTEIN-BARR NUCLEAR ANTIGEN: CPT

## 2025-05-14 PROCEDURE — 85025 COMPLETE CBC W/AUTO DIFF WBC: CPT

## 2025-05-14 PROCEDURE — 82746 ASSAY OF FOLIC ACID SERUM: CPT

## 2025-05-14 PROCEDURE — 82728 ASSAY OF FERRITIN: CPT

## 2025-05-14 PROCEDURE — 84443 ASSAY THYROID STIM HORMONE: CPT

## 2025-05-14 PROCEDURE — 86665 EPSTEIN-BARR CAPSID VCA: CPT

## 2025-05-14 PROCEDURE — 84403 ASSAY OF TOTAL TESTOSTERONE: CPT

## 2025-05-14 PROCEDURE — 83540 ASSAY OF IRON: CPT

## 2025-05-14 PROCEDURE — 86645 CMV ANTIBODY IGM: CPT

## 2025-05-14 PROCEDURE — 82607 VITAMIN B-12: CPT

## 2025-05-14 PROCEDURE — 83036 HEMOGLOBIN GLYCOSYLATED A1C: CPT

## 2025-05-14 PROCEDURE — 82306 VITAMIN D 25 HYDROXY: CPT

## 2025-05-14 PROCEDURE — 86644 CMV ANTIBODY: CPT

## 2025-05-14 PROCEDURE — 36415 COLL VENOUS BLD VENIPUNCTURE: CPT

## 2025-05-14 PROCEDURE — 84466 ASSAY OF TRANSFERRIN: CPT

## 2025-05-14 PROCEDURE — 84439 ASSAY OF FREE THYROXINE: CPT

## 2025-05-15 LAB
CMV IGG SERPL IA-ACNC: <0.6 U/ML (ref 0–0.59)
CMV IGM SERPL IA-ACNC: <30 AU/ML (ref 0–29.9)
EBV NA IGG SER IA-ACNC: 223 U/ML (ref 0–17.9)
EBV VCA IGG SER IA-ACNC: 503 U/ML (ref 0–17.9)
EBV VCA IGM SER IA-ACNC: <36 U/ML (ref 0–35.9)
SERVICE CMNT-IMP: ABNORMAL

## 2025-05-16 ENCOUNTER — RESULTS FOLLOW-UP (OUTPATIENT)
Dept: LAB | Facility: HOSPITAL | Age: 65
End: 2025-05-16
Payer: MEDICARE

## 2025-05-16 ENCOUNTER — TELEPHONE (OUTPATIENT)
Dept: FAMILY MEDICINE CLINIC | Facility: CLINIC | Age: 65
End: 2025-05-16
Payer: MEDICARE

## 2025-05-16 DIAGNOSIS — E55.9 VITAMIN D DEFICIENCY: Primary | ICD-10-CM

## 2025-05-16 RX ORDER — ERGOCALCIFEROL 1.25 MG/1
50000 CAPSULE, LIQUID FILLED ORAL WEEKLY
Qty: 12 CAPSULE | Refills: 0 | Status: SHIPPED | OUTPATIENT
Start: 2025-05-16

## 2025-05-16 NOTE — TELEPHONE ENCOUNTER
pt oncerned about ebv lab results and I told him that once jazz reviewed his results I would call him

## 2025-05-19 NOTE — TELEPHONE ENCOUNTER
Patient informed and voiced understanding of lab results.      IgM is negative, no acute illness, IgG showing history of EBV in the past     Vitamin D is low, needs to start supplement     B12, thyroid, folate testosterone were normal     CMV negative     Enzymes are chronically elevated he is managed by GI

## 2025-05-27 DIAGNOSIS — K21.9 GASTROESOPHAGEAL REFLUX DISEASE, UNSPECIFIED WHETHER ESOPHAGITIS PRESENT: ICD-10-CM

## 2025-05-27 RX ORDER — PANTOPRAZOLE SODIUM 40 MG/1
40 TABLET, DELAYED RELEASE ORAL DAILY
Qty: 90 TABLET | Refills: 1 | Status: SHIPPED | OUTPATIENT
Start: 2025-05-27

## 2025-06-27 ENCOUNTER — TRANSCRIBE ORDERS (OUTPATIENT)
Dept: ADMINISTRATIVE | Facility: HOSPITAL | Age: 65
End: 2025-06-27
Payer: MEDICARE

## 2025-06-27 DIAGNOSIS — R09.A2 SENSATION OF FOREIGN BODY IN THROAT: Primary | ICD-10-CM

## 2025-06-27 DIAGNOSIS — R05.3 CHRONIC COUGH: ICD-10-CM

## 2025-07-11 ENCOUNTER — HOSPITAL ENCOUNTER (OUTPATIENT)
Dept: ULTRASOUND IMAGING | Facility: HOSPITAL | Age: 65
Discharge: HOME | End: 2025-07-11
Payer: MEDICARE

## 2025-07-11 DIAGNOSIS — R09.A2 SENSATION OF FOREIGN BODY IN THROAT: ICD-10-CM

## 2025-07-11 DIAGNOSIS — R05.3 CHRONIC COUGH: ICD-10-CM

## 2025-07-11 PROCEDURE — 76536 US EXAM OF HEAD AND NECK: CPT

## 2025-07-20 DIAGNOSIS — E55.9 VITAMIN D DEFICIENCY: ICD-10-CM

## 2025-07-22 RX ORDER — ERGOCALCIFEROL 1.25 MG/1
50000 CAPSULE, LIQUID FILLED ORAL WEEKLY
Qty: 12 CAPSULE | Refills: 3 | Status: SHIPPED | OUTPATIENT
Start: 2025-07-22

## 2025-07-23 ENCOUNTER — OFFICE VISIT (OUTPATIENT)
Dept: FAMILY MEDICINE CLINIC | Facility: CLINIC | Age: 65
End: 2025-07-23
Payer: MEDICARE

## 2025-07-23 VITALS
HEIGHT: 69 IN | OXYGEN SATURATION: 95 % | DIASTOLIC BLOOD PRESSURE: 48 MMHG | SYSTOLIC BLOOD PRESSURE: 108 MMHG | WEIGHT: 299.6 LBS | BODY MASS INDEX: 44.38 KG/M2 | HEART RATE: 88 BPM

## 2025-07-23 DIAGNOSIS — Z00.00 ENCOUNTER FOR INITIAL ANNUAL WELLNESS VISIT IN MEDICARE PATIENT: Primary | ICD-10-CM

## 2025-07-23 DIAGNOSIS — K76.0 HEPATIC STEATOSIS: ICD-10-CM

## 2025-07-23 DIAGNOSIS — R73.03 PREDIABETES: ICD-10-CM

## 2025-07-23 DIAGNOSIS — E55.9 VITAMIN D DEFICIENCY: ICD-10-CM

## 2025-07-23 DIAGNOSIS — Z12.5 SCREENING PSA (PROSTATE SPECIFIC ANTIGEN): ICD-10-CM

## 2025-07-23 DIAGNOSIS — Z79.899 HIGH RISK MEDICATION USE: ICD-10-CM

## 2025-07-23 DIAGNOSIS — Z13.220 SCREENING FOR LIPID DISORDERS: ICD-10-CM

## 2025-07-23 DIAGNOSIS — I10 ESSENTIAL HYPERTENSION: ICD-10-CM

## 2025-07-23 NOTE — PROGRESS NOTES
Subjective   The ABCs of the Annual Wellness Visit  Medicare Wellness Visit      Dennis Finnegan is a 65 y.o. patient who presents for a Medicare Wellness Visit.    The following portions of the patient's history were reviewed and   updated as appropriate: He  has a past medical history of Anxiety, BPH (benign prostatic hyperplasia), Depression, Erectile dysfunction, GERD (gastroesophageal reflux disease), Hypertension, Low back pain, and Sleep apnea.  He does not have any pertinent problems on file.  He  has a past surgical history that includes Colonoscopy; Back surgery (2020); and Esophagogastroduodenoscopy (N/A, 3/19/2025).  His family history is not on file.  He  reports that he quit smoking about 22 years ago. His smoking use included cigarettes. He started smoking about 48 years ago. He has a 26 pack-year smoking history. He has been exposed to tobacco smoke. He has never used smokeless tobacco. He reports that he does not currently use alcohol after a past usage of about 4.0 standard drinks of alcohol per week. He reports that he does not use drugs.  Current Outpatient Medications   Medication Sig Dispense Refill    ALPRAZolam (XANAX) 0.5 MG tablet Take 1 tablet by mouth Daily As Needed for Anxiety. 18 tablet 0    atenolol (TENORMIN) 25 MG tablet TAKE 1 TABLET DAILY 90 tablet 1    azelastine (ASTELIN) 0.1 % nasal spray Administer 2 sprays into the nostril(s) as directed by provider 2 (Two) Times a Day.      cetirizine (zyrTEC) 10 MG tablet Take 1 tablet by mouth Daily.      EPINEPHrine (ADRENALIN) 0.1 % nasal solution Administer 0.3 mL into the nostril(s) as directed by provider Daily.      Micardis HCT 40-12.5 MG per tablet Take 1 tablet by mouth Daily.      pantoprazole (PROTONIX) 40 MG EC tablet Take 1 tablet by mouth Daily. 90 tablet 1    tadalafil (CIALIS) 5 MG tablet Take 1 tablet by mouth Daily. 30 tablet 3    tamsulosin (FLOMAX) 0.4 MG capsule 24 hr capsule TAKE 1 CAPSULE DAILY 90 capsule 3    vitamin D  (ERGOCALCIFEROL) 1.25 MG (01547 UT) capsule capsule TAKE 1 CAPSULE ONCE A WEEK 12 capsule 3     No current facility-administered medications for this visit.     Current Outpatient Medications on File Prior to Visit   Medication Sig    ALPRAZolam (XANAX) 0.5 MG tablet Take 1 tablet by mouth Daily As Needed for Anxiety.    atenolol (TENORMIN) 25 MG tablet TAKE 1 TABLET DAILY    azelastine (ASTELIN) 0.1 % nasal spray Administer 2 sprays into the nostril(s) as directed by provider 2 (Two) Times a Day.    cetirizine (zyrTEC) 10 MG tablet Take 1 tablet by mouth Daily.    EPINEPHrine (ADRENALIN) 0.1 % nasal solution Administer 0.3 mL into the nostril(s) as directed by provider Daily.    Micardis HCT 40-12.5 MG per tablet Take 1 tablet by mouth Daily.    pantoprazole (PROTONIX) 40 MG EC tablet Take 1 tablet by mouth Daily.    tadalafil (CIALIS) 5 MG tablet Take 1 tablet by mouth Daily.    tamsulosin (FLOMAX) 0.4 MG capsule 24 hr capsule TAKE 1 CAPSULE DAILY    vitamin D (ERGOCALCIFEROL) 1.25 MG (01938 UT) capsule capsule TAKE 1 CAPSULE ONCE A WEEK     No current facility-administered medications on file prior to visit.     He has no known allergies..    Compared to one year ago, the patient's physical   health is the same.  Compared to one year ago, the patient's mental   health is the same.    Recent Hospitalizations:  He was not admitted to the hospital during the last year.     Current Medical Providers:  Patient Care Team:  Lia Gillis APRN as PCP - General (Nurse Practitioner)    Outpatient Medications Prior to Visit   Medication Sig Dispense Refill    ALPRAZolam (XANAX) 0.5 MG tablet Take 1 tablet by mouth Daily As Needed for Anxiety. 18 tablet 0    atenolol (TENORMIN) 25 MG tablet TAKE 1 TABLET DAILY 90 tablet 1    azelastine (ASTELIN) 0.1 % nasal spray Administer 2 sprays into the nostril(s) as directed by provider 2 (Two) Times a Day.      cetirizine (zyrTEC) 10 MG tablet Take 1 tablet by mouth Daily.       "EPINEPHrine (ADRENALIN) 0.1 % nasal solution Administer 0.3 mL into the nostril(s) as directed by provider Daily.      Micardis HCT 40-12.5 MG per tablet Take 1 tablet by mouth Daily.      pantoprazole (PROTONIX) 40 MG EC tablet Take 1 tablet by mouth Daily. 90 tablet 1    tadalafil (CIALIS) 5 MG tablet Take 1 tablet by mouth Daily. 30 tablet 3    tamsulosin (FLOMAX) 0.4 MG capsule 24 hr capsule TAKE 1 CAPSULE DAILY 90 capsule 3    vitamin D (ERGOCALCIFEROL) 1.25 MG (81928 UT) capsule capsule TAKE 1 CAPSULE ONCE A WEEK 12 capsule 3     No facility-administered medications prior to visit.     No opioid medication identified on active medication list. I have reviewed chart for other potential  high risk medication/s and harmful drug interactions in the elderly.      Aspirin is not on active medication list.  Aspirin use is not indicated based on review of current medical condition/s. Risk of harm outweighs potential benefits.  .    Patient Active Problem List   Diagnosis    Anxiety    Benign prostatic hyperplasia with urinary obstruction    Elevated liver enzymes    Essential hypertension    Gastroesophageal reflux disease    Morbid obesity    Near syncope    Recurrent major depression    Sedative dependence    ED (erectile dysfunction)    Elevated LFTs    High risk medication use    Hepatic steatosis    Esophageal dysphagia     Advance Care Planning Advance Directive is not on file.  ACP discussion was held with the patient during this visit. Patient does not have an advance directive, information provided.            Objective   Vitals:    07/23/25 0822   BP: 108/48   BP Location: Left arm   Patient Position: Sitting   Cuff Size: Large Adult   Pulse: 88   SpO2: 95%   Weight: 136 kg (299 lb 9.6 oz)   Height: 175.6 cm (69.15\")   PainSc: 4    PainLoc: Back       Estimated body mass index is 44.05 kg/m² as calculated from the following:    Height as of this encounter: 175.6 cm (69.15\").    Weight as of this encounter: " 136 kg (299 lb 9.6 oz).    Class 3 Severe Obesity (BMI >=40). Obesity-related health conditions include the following: hypertension, impaired fasting glucose, and dyslipidemias. Obesity is unchanged. BMI is is above average; BMI management plan is completed. We discussed low calorie, low carb based diet program, portion control, and increasing exercise.           Does the patient have evidence of cognitive impairment? No  Lab Results   Component Value Date    HGBA1C 6.10 (H) 2025                                                                                                Health  Risk Assessment    Smoking Status:  Social History     Tobacco Use   Smoking Status Former    Current packs/day: 0.00    Average packs/day: 1 pack/day for 26.0 years (26.0 ttl pk-yrs)    Types: Cigarettes    Start date: 1977    Quit date: 2003    Years since quittin.5    Passive exposure: Past   Smokeless Tobacco Never     Alcohol Consumption:  Social History     Substance and Sexual Activity   Alcohol Use Not Currently    Alcohol/week: 4.0 standard drinks of alcohol    Types: 4 Shots of liquor per week    Comment: occ       Fall Risk Screen  STEADI Fall Risk Assessment was completed, and patient is at LOW risk for falls.Assessment completed on:2025    Depression Screening   Little interest or pleasure in doing things? Not at all   Feeling down, depressed, or hopeless? Not at all   PHQ-2 Total Score 0      Health Habits and Functional and Cognitive Screenin/23/2025     8:29 AM   Functional & Cognitive Status   Do you have difficulty preparing food and eating? No   Do you have difficulty bathing yourself, getting dressed or grooming yourself? No   Do you have difficulty using the toilet? No   Do you have difficulty moving around from place to place? No   Do you have trouble with steps or getting out of a bed or a chair? No   Current Diet Unhealthy Diet   Dental Exam Not up to date   Eye Exam Not up to date    Exercise (times per week) 1 times per week   Current Exercises Include Yard Work   Do you need help using the phone?  No   Are you deaf or do you have serious difficulty hearing?  No   Do you need help to go to places out of walking distance? No   Do you need help shopping? No   Do you need help preparing meals?  No   Do you need help with housework?  No   Do you need help with laundry? No   Do you need help taking your medications? No   Do you need help managing money? No   Do you ever drive or ride in a car without wearing a seat belt? No   Have you felt unusual fatigue (could be tiredness), stress, anger or loneliness in the last month? No   Who do you live with? Alone   If you need help, do you have trouble finding someone available to you? No   Have you been bothered in the last four weeks by sexual problems? No   Do you have difficulty concentrating, remembering or making decisions? No           Age-appropriate Screening Schedule:  Refer to the list below for future screening recommendations based on patient's age, sex and/or medical conditions. Orders for these recommended tests are listed in the plan section. The patient has been provided with a written plan.    Health Maintenance List  Health Maintenance   Topic Date Due    ANNUAL WELLNESS VISIT  06/18/2025    COLORECTAL CANCER SCREENING  01/02/2026    COVID-19 Vaccine (3 - 2024-25 season) 08/06/2025 (Originally 9/1/2024)    INFLUENZA VACCINE  10/01/2025    TDAP/TD VACCINES (2 - Td or Tdap) 12/17/2034    HEPATITIS C SCREENING  Completed    Pneumococcal Vaccine 50+  Completed    AAA SCREEN ONCE  Completed    ZOSTER VACCINE  Completed                                                                                                                                                CMS Preventative Services Quick Reference  Risk Factors Identified During Encounter  None Identified    The above risks/problems have been discussed with the patient.  Pertinent  "information has been shared with the patient in the After Visit Summary.  An After Visit Summary and PPPS were made available to the patient.    Follow Up:   Next Medicare Wellness visit to be scheduled in 1 year.         Additional E&M Note during same encounter follows:  Patient has additional, significant, and separately identifiable condition(s)/problem(s) that require work above and beyond the Medicare Wellness Visit     Chief Complaint  Medicare Wellness-subsequent    Subjective   HPI  Dennis is also being seen today for additional medical problem/s.    Review of Systems   Constitutional:  Positive for fatigue.   HENT:  Positive for trouble swallowing.    Respiratory:  Positive for cough.          HTN-is currently on atenolol and Micardis.  Patient's blood pressure is well-controlled in office today at 108/48.  Denies any current chest pain, headache, shortness of breath, syncope.     GERD-patient is currently taking Protonix daily.  He is still having some reflux and chronic cough. Sees GI, EGD done March 2025. Thyroid US normal last week.      Anxiety-patient taking Xanax 0.5 mg once ever 2 weeks. Denies any adverse effects of medication. Without this medication he has decreased quality of life.  UDS and CSA updated 1/23/25. PDMP/Neil reviewed today.      BPH and erectile dysfunction-patient is currently on Cialis and Flomax.  Patient reports that controls his symptoms well.      Patient is due labs. Orders placed at today's visit. Discussed with patient that these are fasting labs.      No other concerns or complaints at this time.  Patient denies any diarrhea, constipation, blood in stool, urinary urgency, frequency, or dysuria, chest pain, shortness of breath or syncope.           Objective   Vital Signs:  /48 (BP Location: Left arm, Patient Position: Sitting, Cuff Size: Large Adult)   Pulse 88   Ht 175.6 cm (69.15\")   Wt 136 kg (299 lb 9.6 oz)   SpO2 95%   BMI 44.05 kg/m²   Physical " Exam  Vitals reviewed.   Constitutional:       General: He is not in acute distress.     Appearance: He is not ill-appearing.   HENT:      Head: Normocephalic and atraumatic.   Eyes:      Conjunctiva/sclera: Conjunctivae normal.   Cardiovascular:      Rate and Rhythm: Normal rate and regular rhythm.      Heart sounds: Normal heart sounds.   Pulmonary:      Effort: Pulmonary effort is normal.      Breath sounds: Normal breath sounds.   Neurological:      Mental Status: He is alert and oriented to person, place, and time.   Psychiatric:         Mood and Affect: Affect is blunt.         Behavior: Behavior normal.         Thought Content: Thought content normal.         Judgment: Judgment normal.         The following data was reviewed by: FROYLAN Thomas on 07/23/2025:  Data reviewed : Consultant notes GI  Common labs          3/11/2025    07:02 4/28/2025    11:06 5/14/2025    07:23   Common Labs   Glucose   111    BUN   9    Creatinine   0.82    Sodium   135    Potassium   4.3    Chloride   99    Calcium   9.6    Albumin 3.4   4.0    Total Bilirubin 0.6   1.0    Alkaline Phosphatase 64   64    AST (SGOT) 47   50    ALT (SGPT) 61   64    WBC  7.39  7.34    Hemoglobin  16.3  16.9    Hematocrit  47.2  49.0    Platelets  336  294    Hemoglobin A1C   6.10             Assessment and Plan Additional age appropriate preventative wellness advice topics were discussed during today's preventative wellness exam(some topics already addressed during AWV portion of the note above):    Physical Activity: Advised cardiovascular activity 150 minutes per week as tolerated. (example brisk walk for 30 minutes, 5 days a week).     Nutrition: Discussed nutrition plan with patient. Information shared in after visit summary. Goal is for a well balanced diet to enhance overall health.     Healthy Weight: Discussed current and goal BMI with patient. Steps to attain this goal discussed. Information shared in after visit summary.     Injury  Prevention Discussion:  Information shared in after visit summary.           Encounter for initial annual wellness visit in Medicare patient  Care gaps addressed  Orders:    Comprehensive Metabolic Panel; Future    Lipid Panel; Future    PSA Screen; Future    Vitamin D 25 hydroxy; Future    Hemoglobin A1c; Future    Vitamin D deficiency    Orders:    Vitamin D 25 hydroxy; Future    Hepatic steatosis  Continue to see GI for management  Orders:    Comprehensive Metabolic Panel; Future    Lipid Panel; Future    Prediabetes    Orders:    Comprehensive Metabolic Panel; Future    Lipid Panel; Future    Hemoglobin A1c; Future    Essential hypertension  Hypertension is stable and controlled  Continue current treatment regimen.  Blood pressure will be reassessed in 6 months.    Orders:    Comprehensive Metabolic Panel; Future    Lipid Panel; Future    High risk medication use  UDS/CSA up to date from January. PDMP/Neil reviewed today by me and appropriate.        Screening PSA (prostate specific antigen)    Orders:    PSA Screen; Future    Screening for lipid disorders       Orders:    Lipid Panel; Future            Follow Up   Return in about 6 months (around 1/23/2026) for Next scheduled follow up.  Patient was given instructions and counseling regarding his condition or for health maintenance advice. Please see specific information pulled into the AVS if appropriate.

## 2025-07-23 NOTE — ASSESSMENT & PLAN NOTE
Continue to see GI for management  Orders:    Comprehensive Metabolic Panel; Future    Lipid Panel; Future

## 2025-07-31 ENCOUNTER — PATIENT MESSAGE (OUTPATIENT)
Dept: FAMILY MEDICINE CLINIC | Facility: CLINIC | Age: 65
End: 2025-07-31
Payer: MEDICARE

## 2025-07-31 DIAGNOSIS — N52.9 ERECTILE DYSFUNCTION, UNSPECIFIED ERECTILE DYSFUNCTION TYPE: ICD-10-CM

## 2025-07-31 RX ORDER — TADALAFIL 5 MG/1
5 TABLET ORAL DAILY
Qty: 90 TABLET | Refills: 0 | Status: SHIPPED | OUTPATIENT
Start: 2025-07-31

## 2025-08-11 ENCOUNTER — LAB (OUTPATIENT)
Dept: LAB | Facility: HOSPITAL | Age: 65
End: 2025-08-11
Payer: MEDICARE

## 2025-08-11 DIAGNOSIS — R73.03 PREDIABETES: ICD-10-CM

## 2025-08-11 DIAGNOSIS — Z00.00 ENCOUNTER FOR INITIAL ANNUAL WELLNESS VISIT IN MEDICARE PATIENT: ICD-10-CM

## 2025-08-11 DIAGNOSIS — I10 ESSENTIAL HYPERTENSION: ICD-10-CM

## 2025-08-11 DIAGNOSIS — K76.0 HEPATIC STEATOSIS: ICD-10-CM

## 2025-08-11 DIAGNOSIS — E55.9 VITAMIN D DEFICIENCY: ICD-10-CM

## 2025-08-11 DIAGNOSIS — Z13.220 SCREENING FOR LIPID DISORDERS: ICD-10-CM

## 2025-08-11 DIAGNOSIS — Z12.5 SCREENING PSA (PROSTATE SPECIFIC ANTIGEN): ICD-10-CM

## 2025-08-11 LAB
25(OH)D3 SERPL-MCNC: 33 NG/ML (ref 30–100)
ALBUMIN SERPL-MCNC: 4.1 G/DL (ref 3.5–5.2)
ALBUMIN/GLOB SERPL: 1.2 G/DL
ALP SERPL-CCNC: 62 U/L (ref 39–117)
ALT SERPL W P-5'-P-CCNC: 76 U/L (ref 1–41)
ANION GAP SERPL CALCULATED.3IONS-SCNC: 13.7 MMOL/L (ref 5–15)
AST SERPL-CCNC: 52 U/L (ref 1–40)
BILIRUB CONJ SERPL-MCNC: 0.2 MG/DL (ref 0–0.3)
BILIRUB SERPL-MCNC: 0.7 MG/DL (ref 0–1.2)
BUN SERPL-MCNC: 11 MG/DL (ref 8–23)
BUN/CREAT SERPL: 13.3 (ref 7–25)
CALCIUM SPEC-SCNC: 9.9 MG/DL (ref 8.6–10.5)
CHLORIDE SERPL-SCNC: 98 MMOL/L (ref 98–107)
CHOLEST SERPL-MCNC: 148 MG/DL (ref 0–200)
CO2 SERPL-SCNC: 24.3 MMOL/L (ref 22–29)
CREAT SERPL-MCNC: 0.83 MG/DL (ref 0.76–1.27)
DEPRECATED RDW RBC AUTO: 44 FL (ref 37–54)
EGFRCR SERPLBLD CKD-EPI 2021: 96.5 ML/MIN/1.73
ERYTHROCYTE [DISTWIDTH] IN BLOOD BY AUTOMATED COUNT: 13 % (ref 12.3–15.4)
GLOBULIN UR ELPH-MCNC: 3.3 GM/DL
GLUCOSE SERPL-MCNC: 110 MG/DL (ref 65–99)
HBA1C MFR BLD: 6 % (ref 4.8–5.6)
HCT VFR BLD AUTO: 49.2 % (ref 37.5–51)
HDLC SERPL-MCNC: 35 MG/DL (ref 40–60)
HGB BLD-MCNC: 17.1 G/DL (ref 13–17.7)
INR PPP: 0.93 (ref 0.86–1.15)
LDLC SERPL CALC-MCNC: 82 MG/DL (ref 0–100)
LDLC/HDLC SERPL: 2.21 {RATIO}
MCH RBC QN AUTO: 32.6 PG (ref 26.6–33)
MCHC RBC AUTO-ENTMCNC: 34.8 G/DL (ref 31.5–35.7)
MCV RBC AUTO: 93.7 FL (ref 79–97)
PLATELET # BLD AUTO: 319 10*3/MM3 (ref 140–450)
PMV BLD AUTO: 11 FL (ref 6–12)
POTASSIUM SERPL-SCNC: 4.6 MMOL/L (ref 3.5–5.2)
PROT SERPL-MCNC: 7.4 G/DL (ref 6–8.5)
PROTHROMBIN TIME: 12.9 SECONDS (ref 11.8–14.9)
PSA SERPL-MCNC: 0.54 NG/ML (ref 0–4)
RBC # BLD AUTO: 5.25 10*6/MM3 (ref 4.14–5.8)
SODIUM SERPL-SCNC: 136 MMOL/L (ref 136–145)
TRIGL SERPL-MCNC: 178 MG/DL (ref 0–150)
VLDLC SERPL-MCNC: 31 MG/DL (ref 5–40)
WBC NRBC COR # BLD AUTO: 6.8 10*3/MM3 (ref 3.4–10.8)

## 2025-08-11 PROCEDURE — 36415 COLL VENOUS BLD VENIPUNCTURE: CPT

## 2025-08-11 PROCEDURE — 85027 COMPLETE CBC AUTOMATED: CPT

## 2025-08-11 PROCEDURE — G0103 PSA SCREENING: HCPCS

## 2025-08-11 PROCEDURE — 85610 PROTHROMBIN TIME: CPT

## 2025-08-11 PROCEDURE — 80061 LIPID PANEL: CPT

## 2025-08-11 PROCEDURE — 80053 COMPREHEN METABOLIC PANEL: CPT

## 2025-08-11 PROCEDURE — 82306 VITAMIN D 25 HYDROXY: CPT

## 2025-08-11 PROCEDURE — 82248 BILIRUBIN DIRECT: CPT

## 2025-08-11 PROCEDURE — 83036 HEMOGLOBIN GLYCOSYLATED A1C: CPT

## 2025-08-12 ENCOUNTER — OFFICE VISIT (OUTPATIENT)
Dept: FAMILY MEDICINE CLINIC | Facility: CLINIC | Age: 65
End: 2025-08-12
Payer: MEDICARE

## 2025-08-12 VITALS
HEIGHT: 69 IN | DIASTOLIC BLOOD PRESSURE: 84 MMHG | WEIGHT: 294 LBS | BODY MASS INDEX: 43.55 KG/M2 | OXYGEN SATURATION: 94 % | SYSTOLIC BLOOD PRESSURE: 127 MMHG | HEART RATE: 92 BPM

## 2025-08-12 DIAGNOSIS — M62.838 MUSCLE SPASM: ICD-10-CM

## 2025-08-12 DIAGNOSIS — M79.18 PAIN OF RIGHT DELTOID: Primary | ICD-10-CM

## 2025-08-12 DIAGNOSIS — M25.511 ACUTE PAIN OF RIGHT SHOULDER: ICD-10-CM

## 2025-08-12 RX ORDER — CYCLOBENZAPRINE HCL 10 MG
10 TABLET ORAL 3 TIMES DAILY PRN
Qty: 21 TABLET | Refills: 1 | Status: SHIPPED | OUTPATIENT
Start: 2025-08-12

## 2025-08-12 RX ORDER — METHYLPREDNISOLONE 4 MG/1
TABLET ORAL
Qty: 21 TABLET | Refills: 0 | Status: SHIPPED | OUTPATIENT
Start: 2025-08-12

## 2025-08-12 RX ORDER — KETOROLAC TROMETHAMINE 30 MG/ML
30 INJECTION, SOLUTION INTRAMUSCULAR; INTRAVENOUS ONCE
Status: SHIPPED | OUTPATIENT
Start: 2025-08-12 | End: 2025-08-17

## 2025-08-22 ENCOUNTER — PATIENT MESSAGE (OUTPATIENT)
Dept: FAMILY MEDICINE CLINIC | Facility: CLINIC | Age: 65
End: 2025-08-22
Payer: MEDICARE

## 2025-08-22 DIAGNOSIS — M25.511 ACUTE PAIN OF RIGHT SHOULDER: Primary | ICD-10-CM

## (undated) DEVICE — BLCK/BITE BLOX WO/DENTL/RIM W/STRAP 54F

## (undated) DEVICE — CONN JET HYDRA H20 AUXILIARY DISP

## (undated) DEVICE — LINER SURG CANSTR SXN S/RIGD 1500CC

## (undated) DEVICE — Device

## (undated) DEVICE — SINGLE-USE BIOPSY FORCEPS: Brand: RADIAL JAW 4

## (undated) DEVICE — SOLIDIFIER LIQLOC PLS 1500CC BT

## (undated) DEVICE — THE STERILE LIGHT HANDLE COVER IS USED WITH STERIS SURGICAL LIGHTING AND VISUALIZATION SYSTEMS.

## (undated) DEVICE — SOL IRRG H2O PL/BG 1000ML STRL

## (undated) DEVICE — DEFENDO AIR WATER SUCTION AND BIOPSY VALVE KIT FOR  OLYMPUS: Brand: DEFENDO AIR/WATER/SUCTION AND BIOPSY VALVE